# Patient Record
Sex: FEMALE | Race: BLACK OR AFRICAN AMERICAN | NOT HISPANIC OR LATINO | URBAN - METROPOLITAN AREA
[De-identification: names, ages, dates, MRNs, and addresses within clinical notes are randomized per-mention and may not be internally consistent; named-entity substitution may affect disease eponyms.]

---

## 2018-02-07 PROBLEM — Z00.00 ENCOUNTER FOR PREVENTIVE HEALTH EXAMINATION: Status: ACTIVE | Noted: 2018-02-07

## 2018-02-14 ENCOUNTER — OUTPATIENT (OUTPATIENT)
Dept: OUTPATIENT SERVICES | Facility: HOSPITAL | Age: 28
LOS: 1 days | Discharge: HOME | End: 2018-02-14

## 2018-02-14 ENCOUNTER — APPOINTMENT (OUTPATIENT)
Dept: OBGYN | Facility: CLINIC | Age: 28
End: 2018-02-14

## 2018-02-14 ENCOUNTER — NON-APPOINTMENT (OUTPATIENT)
Age: 28
End: 2018-02-14

## 2018-02-14 VITALS — WEIGHT: 177 LBS | HEIGHT: 67 IN | BODY MASS INDEX: 27.78 KG/M2

## 2018-02-14 LAB
BP DIAS: 60 MM HG
BP SYS: 100 MM HG

## 2018-02-14 RX ORDER — METOCLOPRAMIDE 10 MG/1
10 TABLET ORAL
Qty: 40 | Refills: 5 | Status: ACTIVE | COMMUNITY
Start: 2018-02-14 | End: 1900-01-01

## 2018-02-15 DIAGNOSIS — Z34.01 ENCOUNTER FOR SUPERVISION OF NORMAL FIRST PREGNANCY, FIRST TRIMESTER: ICD-10-CM

## 2018-02-18 ENCOUNTER — NON-APPOINTMENT (OUTPATIENT)
Age: 28
End: 2018-02-18

## 2018-02-18 LAB
C TRACH RRNA SPEC QL NAA+PROBE: NOT DETECTED
N GONORRHOEA RRNA SPEC QL NAA+PROBE: NOT DETECTED
SOURCE AMPLIFICATION: NORMAL

## 2018-02-20 ENCOUNTER — APPOINTMENT (OUTPATIENT)
Dept: OBGYN | Facility: CLINIC | Age: 28
End: 2018-02-20

## 2018-02-26 ENCOUNTER — APPOINTMENT (OUTPATIENT)
Dept: ANTEPARTUM | Facility: CLINIC | Age: 28
End: 2018-02-26

## 2018-02-26 DIAGNOSIS — Z31.5 ENCOUNTER FOR PROCREATIVE GENETIC COUNSELING: ICD-10-CM

## 2018-03-05 ENCOUNTER — EMERGENCY (EMERGENCY)
Facility: HOSPITAL | Age: 28
LOS: 0 days | Discharge: HOME | End: 2018-03-05
Attending: EMERGENCY MEDICINE | Admitting: OBSTETRICS & GYNECOLOGY

## 2018-03-05 VITALS
HEART RATE: 80 BPM | DIASTOLIC BLOOD PRESSURE: 61 MMHG | SYSTOLIC BLOOD PRESSURE: 116 MMHG | RESPIRATION RATE: 18 BRPM | OXYGEN SATURATION: 100 % | TEMPERATURE: 98 F

## 2018-03-05 VITALS
OXYGEN SATURATION: 100 % | TEMPERATURE: 98 F | HEART RATE: 71 BPM | DIASTOLIC BLOOD PRESSURE: 70 MMHG | SYSTOLIC BLOOD PRESSURE: 130 MMHG | RESPIRATION RATE: 18 BRPM

## 2018-03-05 DIAGNOSIS — Z3A.09 9 WEEKS GESTATION OF PREGNANCY: ICD-10-CM

## 2018-03-05 DIAGNOSIS — N93.9 ABNORMAL UTERINE AND VAGINAL BLEEDING, UNSPECIFIED: ICD-10-CM

## 2018-03-05 DIAGNOSIS — O20.0 THREATENED ABORTION: ICD-10-CM

## 2018-03-05 LAB
ANION GAP SERPL CALC-SCNC: 9 MMOL/L — SIGNIFICANT CHANGE UP (ref 7–14)
APPEARANCE UR: (no result)
APTT BLD: 24.9 SEC — LOW (ref 27–39.2)
BACTERIA # UR AUTO: SIGNIFICANT CHANGE UP
BASOPHILS # BLD AUTO: 0.03 K/UL — SIGNIFICANT CHANGE UP (ref 0–0.2)
BASOPHILS NFR BLD AUTO: 0.3 % — SIGNIFICANT CHANGE UP (ref 0–1)
BILIRUB UR-MCNC: NEGATIVE — SIGNIFICANT CHANGE UP
BLD GP AB SCN SERPL QL: SIGNIFICANT CHANGE UP
BUN SERPL-MCNC: 6 MG/DL — LOW (ref 10–20)
CALCIUM SERPL-MCNC: 9.7 MG/DL — SIGNIFICANT CHANGE UP (ref 8.5–10.1)
CHLORIDE SERPL-SCNC: 98 MMOL/L — SIGNIFICANT CHANGE UP (ref 98–110)
CO2 SERPL-SCNC: 27 MMOL/L — SIGNIFICANT CHANGE UP (ref 17–32)
COLOR SPEC: YELLOW — SIGNIFICANT CHANGE UP
COMMENT - URINE: SIGNIFICANT CHANGE UP
CREAT SERPL-MCNC: 0.6 MG/DL — LOW (ref 0.7–1.5)
DIFF PNL FLD: NEGATIVE — SIGNIFICANT CHANGE UP
EOSINOPHIL # BLD AUTO: 0.08 K/UL — SIGNIFICANT CHANGE UP (ref 0–0.7)
EOSINOPHIL NFR BLD AUTO: 0.7 % — SIGNIFICANT CHANGE UP (ref 0–8)
GLUCOSE SERPL-MCNC: 75 MG/DL — SIGNIFICANT CHANGE UP (ref 70–110)
GLUCOSE UR QL: NEGATIVE MG/DL — SIGNIFICANT CHANGE UP
HCT VFR BLD CALC: 36 % — LOW (ref 37–47)
HGB BLD-MCNC: 12.3 G/DL — SIGNIFICANT CHANGE UP (ref 12–16)
IMM GRANULOCYTES NFR BLD AUTO: 0.5 % — HIGH (ref 0.1–0.3)
INR BLD: 1.13 RATIO — SIGNIFICANT CHANGE UP (ref 0.65–1.3)
KETONES UR-MCNC: NEGATIVE — SIGNIFICANT CHANGE UP
LEUKOCYTE ESTERASE UR-ACNC: NEGATIVE — SIGNIFICANT CHANGE UP
LYMPHOCYTES # BLD AUTO: 2.66 K/UL — SIGNIFICANT CHANGE UP (ref 1.2–3.4)
LYMPHOCYTES # BLD AUTO: 22.3 % — SIGNIFICANT CHANGE UP (ref 20.5–51.1)
MCHC RBC-ENTMCNC: 27.6 PG — SIGNIFICANT CHANGE UP (ref 27–31)
MCHC RBC-ENTMCNC: 34.2 G/DL — SIGNIFICANT CHANGE UP (ref 32–37)
MCV RBC AUTO: 80.9 FL — LOW (ref 81–99)
MONOCYTES # BLD AUTO: 0.62 K/UL — HIGH (ref 0.1–0.6)
MONOCYTES NFR BLD AUTO: 5.2 % — SIGNIFICANT CHANGE UP (ref 1.7–9.3)
NEUTROPHILS # BLD AUTO: 8.5 K/UL — HIGH (ref 1.4–6.5)
NEUTROPHILS NFR BLD AUTO: 71 % — SIGNIFICANT CHANGE UP (ref 42.2–75.2)
NITRITE UR-MCNC: NEGATIVE — SIGNIFICANT CHANGE UP
NRBC # BLD: 0 /100 WBCS — SIGNIFICANT CHANGE UP (ref 0–0)
PH UR: 6 — SIGNIFICANT CHANGE UP (ref 5–8)
PLATELET # BLD AUTO: 329 K/UL — SIGNIFICANT CHANGE UP (ref 130–400)
POTASSIUM SERPL-MCNC: 4 MMOL/L — SIGNIFICANT CHANGE UP (ref 3.5–5)
POTASSIUM SERPL-SCNC: 4 MMOL/L — SIGNIFICANT CHANGE UP (ref 3.5–5)
PROT UR-MCNC: NEGATIVE MG/DL — SIGNIFICANT CHANGE UP
PROTHROM AB SERPL-ACNC: 12.2 SEC — SIGNIFICANT CHANGE UP (ref 9.95–12.87)
RBC # BLD: 4.45 M/UL — SIGNIFICANT CHANGE UP (ref 4.2–5.4)
RBC # FLD: 12.2 % — SIGNIFICANT CHANGE UP (ref 11.5–14.5)
SODIUM SERPL-SCNC: 134 MMOL/L — LOW (ref 135–146)
SP GR SPEC: 1.02 — SIGNIFICANT CHANGE UP (ref 1.01–1.03)
TYPE + AB SCN PNL BLD: SIGNIFICANT CHANGE UP
UROBILINOGEN FLD QL: 1 MG/DL (ref 0.2–0.2)
WBC # BLD: 11.95 K/UL — HIGH (ref 4.8–10.8)
WBC # FLD AUTO: 11.95 K/UL — HIGH (ref 4.8–10.8)

## 2018-03-05 NOTE — ED PROVIDER NOTE - NS ED ROS FT
Review of Systems:  	•	CONSTITUTIONAL - No fever, No diaphoresis, No weight change  	•	SKIN - No rash  	•	HEMATOLOGIC - No abnormal bleeding or bruising  	•	EYES - No eye pain, No blurred vision  	•	ENT - No change in hearing, No sore throat, No neck pain, No rhinorrhea, No ear pain  	•	RESPIRATORY - No shortness of breath, No cough  	•	CARDIAC - No chest pain, No palpitations  	•	GI - No abdominal pain, No nausea, No vomiting, No diarrhea, No constipation, No bright red blood per rectum or melena.                      •                 - No dysuria, frequency, hematuria.  +vaginal bleeding  	•	ENDO - No polydypsia, No polyuria, No heat/cold intolerance  	•	MUSCULOSKELETAL - No joint paint, No swelling, No back pain  	•	NEUROLOGIC - No numbness, No focal weakness, No headache, No dizziness
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
## 2018-03-05 NOTE — CONSULT NOTE ADULT - ASSESSMENT
26 yo  at 9w5d GA by LMP, twin pregnancy, hemodynamically stable with threatened .  -Type and screen pending: if RH negative, give rhogam  -F/u with OBGYN as scheduled next week  -Disposition per ED    Case discussed with Dr. juarez and Dr. Tenorio

## 2018-03-05 NOTE — ED PROVIDER NOTE - OBJECTIVE STATEMENT
27F no pmh, G1, lmp dec 27, currently 9 weeks per pt with twins on sono 2/14, OB = [facility], p/w vag bleeding. pt states she had 1 large blood clot today and rushed to hospital. no abd pain. no nvdc. no fever, chills. no dysuria, freq, hematuria. no cp, sob, palp, loc. no longer bleeding.

## 2018-03-05 NOTE — ED ADULT NURSE NOTE - OBJECTIVE STATEMENT
patient reports to the ED with a complaint of vaginal bleeding x1 episode today. patient states she passed 2 small clots when she went to the bathroom this morning. patient is currently 9 weeks pregnant with twins. also reports some abdominal cramping

## 2018-03-05 NOTE — ED PROVIDER NOTE - PHYSICAL EXAMINATION
VITAL SIGNS: AFebrile, vital signs stable  CONSTITUTIONAL: Well-developed; well-nourished; in no acute distress.  SKIN: Skin exam is warm and dry, no acute rash.  HEAD: Normocephalic; atraumatic.  EYES: Pupils equal round reactive to light, Extraocular movements intact; conjunctiva and sclera clear.  ENT: No nasal discharge; airway clear. Moist mucus membranes.  NECK: Supple; non tender. No rigidity  CARD: Regular rate and rhythm. Normal S1, S2; no murmurs, gallops, or rubs.  RESP: Lungs clear to auscultation bilaterally. No wheezes, rales or rhonchi.  ABD: Abdomen soft; non-tender; non-distended;  no hepatosplenomegaly. No costovertebral angle tenderness.   EXT: Normal ROM. No clubbing, cyanosis or edema. No calf tenderness to palpation.  NEURO: Alert and oriented x 3. No focal deficits.  PSYCH: Cooperative, appropriate.

## 2018-03-05 NOTE — CONSULT NOTE ADULT - SUBJECTIVE AND OBJECTIVE BOX
Chief Complaint: vaginal bleeding    HPI: 28 yo  at j9w5d GA by LMP 17 with known twin pregnancy here with vaginal bleeding. Pt passed 1 quarter sized clot at 11:00 today, no bleeding since then. States that she had some mild cramping yesterday and this morning, resolved now. Denies any continued bleeding or cramping at this time. She is feeling well overall. Had established prenatal care at [facility].      Allergies    No Known Allergies      PAST MEDICAL & SURGICAL HISTORY:    OB/GYN HISTORY:   LMP: 17           Cycle Length: regular             Days Flow: 5                                      Gyn: denies history of abnormal pap, STI, or ovarian cysts. Reports history of uterine fibroids                                      Obstetric:  G1  P 0      FAMILY HISTORY: Denies      SOCIAL HISTORY: Denies cigarette use, alcohol use, or illicit drug use    REVIEW OF SYSTEMS:    CONSTITUTIONAL: No weakness, fevers or chills  EYES/ENT: No visual changes;  No vertigo or throat pain   NECK: No pain or stiffness  RESPIRATORY: No cough, wheezing, hemoptysis; No shortness of breath  CARDIOVASCULAR: No chest pain or palpitations  GASTROINTESTINAL: No abdominal or epigastric pain. No nausea, vomiting, or hematemesis; No diarrhea or constipation. No melena or hematochezia.  GENITOURINARY: No dysuria, frequency or hematuria  NEUROLOGICAL: No numbness or weakness  SKIN: No itching, rashes  All other negative        Vital Signs Last 24 Hrs  T(F): 97.8 (05 Mar 2018 13:16), Max: 97.8 (05 Mar 2018 13:16)  HR: 71 (05 Mar 2018 13:16) (71 - 71)  BP: 130/70 (05 Mar 2018 13:16) (130/70 - 130/70)  RR: 18 (05 Mar 2018 13:16) (18 - 18)  SpO2: 100% (05 Mar 2018 13:16) (100% - 100%)    Physical Exam:  Constitutional: AAOx3, NAD  Breasts: Normal, no masses, nipple discharge, or tenderness bilaterally  Respiratory: CTAB  Cardiovascular: NL S1/S2  Gastrointestinal: Soft, nontender, nondistended, no rebound, guarding, or rigidity  Pelvic: Normal vulva, normal vagina, no bleeding, Cervix normal, closed, no bleeding, no abnormal discharge, Uterus anteverted, 10week sized, no fundal tenderness, no adnexal masses or tenderness    LABS:                        12.3   11.95 )-----------( 329      ( 05 Mar 2018 16:48 )             36.0         03-05    134<L>  |  98  |  6<L>  ----------------------------<  75  4.0   |  27  |  0.6<L>    Ca    9.7      05 Mar 2018 16:48      PT/INR - ( 05 Mar 2018 16:48 )   PT: 12.20 sec;   INR: 1.13 ratio         PTT - ( 05 Mar 2018 16:48 )  PTT:24.9 sec        RADIOLOGY & ADDITIONAL STUDIES:  2 IUPs with FH x2, dividing membranes seen. Pending official read.

## 2018-03-05 NOTE — ED PROVIDER NOTE - PROGRESS NOTE DETAILS
pt feels better, no longer bleeding, cleared by OBGYN for dc home, subchorionic hemorrhage on sono, has appt w dr young on 3/14, strict return precautions provided.
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
## 2018-03-06 LAB — HCG SERPL-ACNC: HIGH MIU/ML (ref 0–5)

## 2018-03-07 LAB
CULTURE RESULTS: NO GROWTH — SIGNIFICANT CHANGE UP
SPECIMEN SOURCE: SIGNIFICANT CHANGE UP

## 2018-03-08 ENCOUNTER — TRANSCRIPTION ENCOUNTER (OUTPATIENT)
Age: 28
End: 2018-03-08

## 2018-03-14 ENCOUNTER — NON-APPOINTMENT (OUTPATIENT)
Age: 28
End: 2018-03-14

## 2018-03-14 ENCOUNTER — LABORATORY RESULT (OUTPATIENT)
Age: 28
End: 2018-03-14

## 2018-03-14 ENCOUNTER — APPOINTMENT (OUTPATIENT)
Dept: OBGYN | Facility: CLINIC | Age: 28
End: 2018-03-14

## 2018-03-14 ENCOUNTER — APPOINTMENT (OUTPATIENT)
Dept: ANTEPARTUM | Facility: CLINIC | Age: 28
End: 2018-03-14

## 2018-03-14 ENCOUNTER — OUTPATIENT (OUTPATIENT)
Dept: OUTPATIENT SERVICES | Facility: HOSPITAL | Age: 28
LOS: 1 days | Discharge: HOME | End: 2018-03-14

## 2018-03-14 VITALS
OXYGEN SATURATION: 98 % | SYSTOLIC BLOOD PRESSURE: 137 MMHG | TEMPERATURE: 98 F | HEIGHT: 67 IN | HEART RATE: 68 BPM | DIASTOLIC BLOOD PRESSURE: 63 MMHG | WEIGHT: 180.38 LBS | BODY MASS INDEX: 28.31 KG/M2

## 2018-03-14 VITALS — WEIGHT: 180 LBS | BODY MASS INDEX: 28.19 KG/M2 | DIASTOLIC BLOOD PRESSURE: 63 MMHG | SYSTOLIC BLOOD PRESSURE: 137 MMHG

## 2018-03-14 DIAGNOSIS — D50.9 IRON DEFICIENCY ANEMIA, UNSPECIFIED: ICD-10-CM

## 2018-03-14 DIAGNOSIS — O36.80X2: ICD-10-CM

## 2018-03-14 DIAGNOSIS — O35.1XX2: ICD-10-CM

## 2018-03-14 DIAGNOSIS — O36.80X1 PREGNANCY WITH INCONCLUSIVE FETAL VIABILITY, FETUS 1: ICD-10-CM

## 2018-03-14 DIAGNOSIS — Z86.2 PERSONAL HISTORY OF DISEASES OF THE BLOOD AND BLOOD-FORMING ORGANS AND CERTAIN DISORDERS INVOLVING THE IMMUNE MECHANISM: ICD-10-CM

## 2018-03-14 DIAGNOSIS — Z34.90 ENCOUNTER FOR SUPERVISION OF NORMAL PREGNANCY, UNSPECIFIED, UNSPECIFIED TRIMESTER: ICD-10-CM

## 2018-03-14 DIAGNOSIS — O35.1XX1 MATERNAL CARE FOR (SUSPECTED) CHROMOSOMAL ABNORMALITY IN FETUS, FETUS 1: ICD-10-CM

## 2018-03-14 DIAGNOSIS — O30.041 TWIN PREGNANCY, DICHORIONIC/DIAMNIOTIC, FIRST TRIMESTER: ICD-10-CM

## 2018-03-14 LAB
BILIRUB UR QL STRIP: NEGATIVE
CLARITY UR: CLEAR
COLLECTION METHOD: NORMAL
GLUCOSE UR-MCNC: NEGATIVE
HCG UR QL: 0.2 EU/DL
HGB UR QL STRIP.AUTO: NEGATIVE
KETONES UR-MCNC: NEGATIVE
LEUKOCYTE ESTERASE UR QL STRIP: NEGATIVE
NITRITE UR QL STRIP: NEGATIVE
OB COMMENTS: NORMAL
PH UR STRIP: 7.5
PROT UR STRIP-MCNC: NEGATIVE
SCHEDULED VISIT: YES
SP GR UR STRIP: 1.03
URINE ALBUMIN/PROTEIN: NEGATIVE
URINE GLUCOSE: NEGATIVE
URINE KETONES: NEGATIVE
WEEKS GESTATION: NORMAL
WEEKS GESTATION: NORMAL

## 2018-03-14 RX ORDER — PRENATAL VIT NO.130/IRON/FOLIC 27MG-0.8MG
TABLET ORAL
Refills: 0 | Status: ACTIVE | COMMUNITY

## 2018-03-15 DIAGNOSIS — O30.041 TWIN PREGNANCY, DICHORIONIC/DIAMNIOTIC, FIRST TRIMESTER: ICD-10-CM

## 2018-03-17 LAB
ABO + RH PNL BLD: NORMAL
BACTERIA UR CULT: NORMAL
BASOPHILS # BLD AUTO: 0.02 K/UL
BASOPHILS NFR BLD AUTO: 0.2 %
BLD GP AB SCN SERPL QL: NORMAL
EOSINOPHIL # BLD AUTO: 0.12 K/UL
EOSINOPHIL NFR BLD AUTO: 1.1 %
HBV SURFACE AG SER QL: NONREACTIVE
HCT VFR BLD CALC: 35.5 %
HGB A MFR BLD: 63.4 %
HGB A2 MFR BLD: 3.1 %
HGB BLD-MCNC: 11.2 G/DL
HGB FRACT BLD-IMP: NORMAL
HGB S BLD QL SOLY: POSITIVE
HGB S MFR BLD: 33.5 %
HIV1+2 AB SPEC QL IA.RAPID: NONREACTIVE
IMM GRANULOCYTES NFR BLD AUTO: 0.4 %
LEAD BLD-MCNC: <1 UG/DL
LYMPHOCYTES # BLD AUTO: 2.29 K/UL
LYMPHOCYTES NFR BLD AUTO: 21.4 %
MAN DIFF?: NORMAL
MCHC RBC-ENTMCNC: 26.7 PG
MCHC RBC-ENTMCNC: 31.5 GM/DL
MCV RBC AUTO: 84.5 FL
MONOCYTES # BLD AUTO: 0.41 K/UL
MONOCYTES NFR BLD AUTO: 3.8 %
NEUTROPHILS # BLD AUTO: 7.81 K/UL
NEUTROPHILS NFR BLD AUTO: 73.1 %
PLATELET # BLD AUTO: 302 K/UL
RBC # BLD: 4.2 M/UL
RBC # FLD: 13.2 %
RUBV IGG FLD-ACNC: 8 INDEX
RUBV IGG SER-IMP: POSITIVE
T PALLIDUM AB SER QL IA: NEGATIVE
VZV AB TITR SER: NORMAL
VZV IGG SER IF-ACNC: 142.9 INDEX
WBC # FLD AUTO: 10.69 K/UL

## 2018-03-26 LAB
AR GENE MUT ANL BLD/T: NORMAL
CFTR MUT TESTED BLD/T: NORMAL

## 2018-04-11 ENCOUNTER — APPOINTMENT (OUTPATIENT)
Dept: OBGYN | Facility: CLINIC | Age: 28
End: 2018-04-11

## 2018-04-11 ENCOUNTER — OUTPATIENT (OUTPATIENT)
Dept: OUTPATIENT SERVICES | Facility: HOSPITAL | Age: 28
LOS: 1 days | Discharge: HOME | End: 2018-04-11

## 2018-04-11 ENCOUNTER — NON-APPOINTMENT (OUTPATIENT)
Age: 28
End: 2018-04-11

## 2018-04-11 VITALS — DIASTOLIC BLOOD PRESSURE: 60 MMHG | WEIGHT: 195 LBS | BODY MASS INDEX: 30.54 KG/M2 | SYSTOLIC BLOOD PRESSURE: 120 MMHG

## 2018-04-11 DIAGNOSIS — O30.049 TWIN PREGNANCY, DICHORIONIC/DIAMNIOTIC, UNSPECIFIED TRIMESTER: ICD-10-CM

## 2018-04-12 DIAGNOSIS — O30.049 TWIN PREGNANCY, DICHORIONIC/DIAMNIOTIC, UNSPECIFIED TRIMESTER: ICD-10-CM

## 2018-04-25 LAB — BACTERIA UR CULT: NORMAL

## 2018-05-04 ENCOUNTER — OUTPATIENT (OUTPATIENT)
Dept: OUTPATIENT SERVICES | Facility: HOSPITAL | Age: 28
LOS: 1 days | Discharge: HOME | End: 2018-05-04

## 2018-05-04 ENCOUNTER — APPOINTMENT (OUTPATIENT)
Dept: ANTEPARTUM | Facility: CLINIC | Age: 28
End: 2018-05-04

## 2018-05-04 VITALS
BODY MASS INDEX: 31.28 KG/M2 | WEIGHT: 199.7 LBS | DIASTOLIC BLOOD PRESSURE: 60 MMHG | TEMPERATURE: 99 F | SYSTOLIC BLOOD PRESSURE: 120 MMHG | OXYGEN SATURATION: 100 % | HEART RATE: 68 BPM

## 2018-05-04 LAB
ADDL FETAL HEART RATE: 143
BILIRUB UR QL STRIP: NEGATIVE
CLARITY UR: CLEAR
COLLECTION METHOD: NORMAL
FETAL HEART RATE (BPM): 141
GLUCOSE UR-MCNC: NEGATIVE
HCG UR QL: 0.2 EU/DL
HGB UR QL STRIP.AUTO: NEGATIVE
KETONES UR-MCNC: NEGATIVE
LEUKOCYTE ESTERASE UR QL STRIP: NEGATIVE
NITRITE UR QL STRIP: NEGATIVE
OB COMMENTS: NORMAL
PH UR STRIP: 6.5
PROT UR STRIP-MCNC: NEGATIVE
SCHEDULED VISIT: YES
SP GR UR STRIP: 1
URINE ALBUMIN/PROTEIN: NEGATIVE
URINE GLUCOSE: NEGATIVE
URINE KETONES: NORMAL
WEEKS GESTATION: NORMAL

## 2018-05-07 DIAGNOSIS — O30.042 TWIN PREGNANCY, DICHORIONIC/DIAMNIOTIC, SECOND TRIMESTER: ICD-10-CM

## 2018-05-07 DIAGNOSIS — O35.8XX1 MATERNAL CARE FOR OTHER (SUSPECTED) FETAL ABNORMALITY AND DAMAGE, FETUS 1: ICD-10-CM

## 2018-05-07 DIAGNOSIS — O35.8XX2 MATERNAL CARE FOR OTHER (SUSPECTED) FETAL ABNORMALITY AND DAMAGE, FETUS 2: ICD-10-CM

## 2018-05-09 ENCOUNTER — APPOINTMENT (OUTPATIENT)
Dept: OBGYN | Facility: CLINIC | Age: 28
End: 2018-05-09

## 2018-05-09 ENCOUNTER — OUTPATIENT (OUTPATIENT)
Dept: OUTPATIENT SERVICES | Facility: HOSPITAL | Age: 28
LOS: 1 days | Discharge: HOME | End: 2018-05-09

## 2018-05-09 ENCOUNTER — RESULT CHARGE (OUTPATIENT)
Age: 28
End: 2018-05-09

## 2018-05-09 VITALS — SYSTOLIC BLOOD PRESSURE: 110 MMHG | BODY MASS INDEX: 31.64 KG/M2 | DIASTOLIC BLOOD PRESSURE: 68 MMHG | WEIGHT: 202 LBS

## 2018-05-09 LAB
BILIRUB UR QL STRIP: NORMAL
CLARITY UR: CLEAR
COLLECTION METHOD: NORMAL
GLUCOSE UR-MCNC: NORMAL
HCG UR QL: 0.2 EU/DL
HGB UR QL STRIP.AUTO: NORMAL
KETONES UR-MCNC: NORMAL
LEUKOCYTE ESTERASE UR QL STRIP: NORMAL
NITRITE UR QL STRIP: NORMAL
PH UR STRIP: 6
PROT UR STRIP-MCNC: NORMAL
SP GR UR STRIP: 1.01

## 2018-05-11 ENCOUNTER — APPOINTMENT (OUTPATIENT)
Dept: ANTEPARTUM | Facility: CLINIC | Age: 28
End: 2018-05-11

## 2018-05-11 DIAGNOSIS — O30.042 TWIN PREGNANCY, DICHORIONIC/DIAMNIOTIC, SECOND TRIMESTER: ICD-10-CM

## 2018-05-21 ENCOUNTER — APPOINTMENT (OUTPATIENT)
Dept: ANTEPARTUM | Facility: CLINIC | Age: 28
End: 2018-05-21

## 2018-05-21 ENCOUNTER — OUTPATIENT (OUTPATIENT)
Dept: OUTPATIENT SERVICES | Facility: HOSPITAL | Age: 28
LOS: 1 days | Discharge: HOME | End: 2018-05-21

## 2018-05-21 VITALS
HEART RATE: 87 BPM | SYSTOLIC BLOOD PRESSURE: 110 MMHG | BODY MASS INDEX: 32.25 KG/M2 | TEMPERATURE: 97.6 F | DIASTOLIC BLOOD PRESSURE: 57 MMHG | HEIGHT: 67 IN | WEIGHT: 205.44 LBS | OXYGEN SATURATION: 100 %

## 2018-05-21 DIAGNOSIS — Z3A.21 21 WEEKS GESTATION OF PREGNANCY: ICD-10-CM

## 2018-05-21 DIAGNOSIS — O35.8XX1 MATERNAL CARE FOR OTHER (SUSPECTED) FETAL ABNORMALITY AND DAMAGE, FETUS 1: ICD-10-CM

## 2018-05-21 DIAGNOSIS — O30.042 TWIN PREGNANCY, DICHORIONIC/DIAMNIOTIC, SECOND TRIMESTER: ICD-10-CM

## 2018-05-21 DIAGNOSIS — O09.92 SUPERVISION OF HIGH RISK PREGNANCY, UNSPECIFIED, SECOND TRIMESTER: ICD-10-CM

## 2018-05-21 DIAGNOSIS — O35.8XX2 MATERNAL CARE FOR OTHER (SUSPECTED) FETAL ABNORMALITY AND DAMAGE, FETUS 2: ICD-10-CM

## 2018-05-21 LAB
BILIRUB UR QL STRIP: NEGATIVE
CLARITY UR: CLEAR
COLLECTION METHOD: NORMAL
GLUCOSE UR-MCNC: NEGATIVE
HCG UR QL: 0.2 EU/DL
HGB UR QL STRIP.AUTO: NEGATIVE
KETONES UR-MCNC: NEGATIVE
LEUKOCYTE ESTERASE UR QL STRIP: NEGATIVE
NITRITE UR QL STRIP: NEGATIVE
PH UR STRIP: 6
PROT UR STRIP-MCNC: NEGATIVE
SP GR UR STRIP: 1.02

## 2018-05-29 ENCOUNTER — APPOINTMENT (OUTPATIENT)
Dept: OBGYN | Facility: CLINIC | Age: 28
End: 2018-05-29

## 2018-06-04 ENCOUNTER — APPOINTMENT (OUTPATIENT)
Dept: ANTEPARTUM | Facility: CLINIC | Age: 28
End: 2018-06-04

## 2018-06-13 ENCOUNTER — RESULT REVIEW (OUTPATIENT)
Age: 28
End: 2018-06-13

## 2018-06-14 ENCOUNTER — NON-APPOINTMENT (OUTPATIENT)
Age: 28
End: 2018-06-14

## 2018-06-18 ENCOUNTER — APPOINTMENT (OUTPATIENT)
Dept: ANTEPARTUM | Facility: CLINIC | Age: 28
End: 2018-06-18

## 2019-08-20 ENCOUNTER — TRANSCRIPTION ENCOUNTER (OUTPATIENT)
Age: 29
End: 2019-08-20

## 2020-04-22 ENCOUNTER — EMERGENCY (EMERGENCY)
Facility: HOSPITAL | Age: 30
LOS: 0 days | Discharge: HOME | End: 2020-04-22
Attending: EMERGENCY MEDICINE | Admitting: EMERGENCY MEDICINE
Payer: COMMERCIAL

## 2020-04-22 VITALS
DIASTOLIC BLOOD PRESSURE: 64 MMHG | HEART RATE: 97 BPM | WEIGHT: 145.06 LBS | HEIGHT: 67 IN | RESPIRATION RATE: 19 BRPM | OXYGEN SATURATION: 98 % | TEMPERATURE: 98 F | SYSTOLIC BLOOD PRESSURE: 116 MMHG

## 2020-04-22 DIAGNOSIS — R07.89 OTHER CHEST PAIN: ICD-10-CM

## 2020-04-22 DIAGNOSIS — R07.9 CHEST PAIN, UNSPECIFIED: ICD-10-CM

## 2020-04-22 DIAGNOSIS — F17.200 NICOTINE DEPENDENCE, UNSPECIFIED, UNCOMPLICATED: ICD-10-CM

## 2020-04-22 PROCEDURE — 71046 X-RAY EXAM CHEST 2 VIEWS: CPT | Mod: 26

## 2020-04-22 PROCEDURE — 99284 EMERGENCY DEPT VISIT MOD MDM: CPT

## 2020-04-22 NOTE — ED PROVIDER NOTE - OBJECTIVE STATEMENT
29 year old female past medical history of marijuana use states she has had three days of central left sided chest pain which is described as a tightness. patient states pain tonight got worse after she was smoking. patient denies shortness of breath, no fever/chills, no cough.

## 2020-04-22 NOTE — ED PROVIDER NOTE - CHPI ED SYMPTOMS NEG
no shortness of breath/no vomiting/no chills/no cough/no diaphoresis/no back pain/no dizziness/no fever/no syncope/no nausea

## 2020-04-22 NOTE — ED PROVIDER NOTE - PATIENT PORTAL LINK FT
You can access the FollowMyHealth Patient Portal offered by [facility] by registering at the following website: [email or web address]. By joining [employer]’s FollowMyHealth portal, you will also be able to view your health information using other applications (apps) compatible with our system.

## 2020-04-22 NOTE — ED PROVIDER NOTE - CLINICAL SUMMARY MEDICAL DECISION MAKING FREE TEXT BOX
Healthy 30 yo F presents with 3 days of L sided, pressure like mild CP, worse this evening after smoking marijuana -- no associated fever, dyspnea, diaphoresis, nausea. VS normal, On exam looks well, clear lungs, RRR, soft, NT, ND abdomen, good distal pulses. EKG normal sinus, CXR without evidence of PTX, pneumomediastinum, PNA.     Low suspicion for ACS, has no ischemic risk factors, atypical pain and normal EKG. Cannot apply PERC due to OCP use but pain is not suggestive of PE, has no associated tachycardia or dyspnea.     Advised on rest, return precautions, follow up.

## 2020-04-24 ENCOUNTER — EMERGENCY (EMERGENCY)
Facility: HOSPITAL | Age: 30
LOS: 0 days | Discharge: HOME | End: 2020-04-24
Attending: STUDENT IN AN ORGANIZED HEALTH CARE EDUCATION/TRAINING PROGRAM | Admitting: STUDENT IN AN ORGANIZED HEALTH CARE EDUCATION/TRAINING PROGRAM
Payer: COMMERCIAL

## 2020-04-24 VITALS — TEMPERATURE: 99 F | RESPIRATION RATE: 20 BRPM | HEART RATE: 71 BPM | OXYGEN SATURATION: 100 %

## 2020-04-24 VITALS
HEART RATE: 62 BPM | TEMPERATURE: 99 F | SYSTOLIC BLOOD PRESSURE: 155 MMHG | RESPIRATION RATE: 20 BRPM | OXYGEN SATURATION: 93 % | WEIGHT: 179.24 LBS | HEIGHT: 67 IN | DIASTOLIC BLOOD PRESSURE: 72 MMHG

## 2020-04-24 DIAGNOSIS — R06.02 SHORTNESS OF BREATH: ICD-10-CM

## 2020-04-24 DIAGNOSIS — R07.89 OTHER CHEST PAIN: ICD-10-CM

## 2020-04-24 PROBLEM — Z78.9 OTHER SPECIFIED HEALTH STATUS: Chronic | Status: ACTIVE | Noted: 2020-04-22

## 2020-04-24 LAB
ALBUMIN SERPL ELPH-MCNC: 5 G/DL — SIGNIFICANT CHANGE UP (ref 3.5–5.2)
ALP SERPL-CCNC: 55 U/L — SIGNIFICANT CHANGE UP (ref 30–115)
ALT FLD-CCNC: 17 U/L — SIGNIFICANT CHANGE UP (ref 0–41)
ANION GAP SERPL CALC-SCNC: 13 MMOL/L — SIGNIFICANT CHANGE UP (ref 7–14)
AST SERPL-CCNC: 22 U/L — SIGNIFICANT CHANGE UP (ref 0–41)
BILIRUB SERPL-MCNC: 0.5 MG/DL — SIGNIFICANT CHANGE UP (ref 0.2–1.2)
BUN SERPL-MCNC: 9 MG/DL — LOW (ref 10–20)
CALCIUM SERPL-MCNC: 9.9 MG/DL — SIGNIFICANT CHANGE UP (ref 8.5–10.1)
CHLORIDE SERPL-SCNC: 100 MMOL/L — SIGNIFICANT CHANGE UP (ref 98–110)
CO2 SERPL-SCNC: 27 MMOL/L — SIGNIFICANT CHANGE UP (ref 17–32)
CREAT SERPL-MCNC: 0.7 MG/DL — SIGNIFICANT CHANGE UP (ref 0.7–1.5)
D DIMER BLD IA.RAPID-MCNC: 306 NG/ML DDU — HIGH (ref 0–230)
GLUCOSE SERPL-MCNC: 91 MG/DL — SIGNIFICANT CHANGE UP (ref 70–99)
HCT VFR BLD CALC: 42 % — SIGNIFICANT CHANGE UP (ref 37–47)
HGB BLD-MCNC: 14.2 G/DL — SIGNIFICANT CHANGE UP (ref 12–16)
MCHC RBC-ENTMCNC: 29 PG — SIGNIFICANT CHANGE UP (ref 27–31)
MCHC RBC-ENTMCNC: 33.8 G/DL — SIGNIFICANT CHANGE UP (ref 32–37)
MCV RBC AUTO: 85.7 FL — SIGNIFICANT CHANGE UP (ref 81–99)
NRBC # BLD: 0 /100 WBCS — SIGNIFICANT CHANGE UP (ref 0–0)
PLATELET # BLD AUTO: 306 K/UL — SIGNIFICANT CHANGE UP (ref 130–400)
POTASSIUM SERPL-MCNC: 3.5 MMOL/L — SIGNIFICANT CHANGE UP (ref 3.5–5)
POTASSIUM SERPL-SCNC: 3.5 MMOL/L — SIGNIFICANT CHANGE UP (ref 3.5–5)
PROT SERPL-MCNC: 8.4 G/DL — HIGH (ref 6–8)
RBC # BLD: 4.9 M/UL — SIGNIFICANT CHANGE UP (ref 4.2–5.4)
RBC # FLD: 12.3 % — SIGNIFICANT CHANGE UP (ref 11.5–14.5)
SODIUM SERPL-SCNC: 140 MMOL/L — SIGNIFICANT CHANGE UP (ref 135–146)
TROPONIN T SERPL-MCNC: <0.01 NG/ML — SIGNIFICANT CHANGE UP
WBC # BLD: 8.51 K/UL — SIGNIFICANT CHANGE UP (ref 4.8–10.8)
WBC # FLD AUTO: 8.51 K/UL — SIGNIFICANT CHANGE UP (ref 4.8–10.8)

## 2020-04-24 PROCEDURE — 99285 EMERGENCY DEPT VISIT HI MDM: CPT

## 2020-04-24 PROCEDURE — 71275 CT ANGIOGRAPHY CHEST: CPT | Mod: 26

## 2020-04-24 PROCEDURE — 71045 X-RAY EXAM CHEST 1 VIEW: CPT | Mod: 26

## 2020-04-24 PROCEDURE — 93010 ELECTROCARDIOGRAM REPORT: CPT

## 2020-04-24 NOTE — ED PROVIDER NOTE - PATIENT PORTAL LINK FT
You can access the FollowMyHealth Patient Portal offered by [facility] by registering at the following website: [email or web address]. By joining [employer]’s FollowMyHealth portal, you will also be able to view your health information using other applications (apps) compatible with our system.

## 2020-04-24 NOTE — ED PROVIDER NOTE - NS ED ROS FT
Review of Systems    Constitutional: (-) fever, (-) chills  Eyes/ENT: (-) blurry vision, (-) epistaxis, (-) sore throat  Cardiovascular: (+) chest pain, (-) syncope  Respiratory: (-) cough, (+) shortness of breath  Gastrointestinal: (-) pain, (-) nausea, (-) vomiting, (-) diarrhea  Musculoskeletal: (-) neck pain, (-) back pain, (-) body aches  Integumentary: (-) rash, (-) edema  Neurological: (-) headache, (-) altered mental status  Psychiatric: (-) hallucinations  Allergic/Immunologic: (-) pruritus

## 2020-04-24 NOTE — ED PROVIDER NOTE - PROVIDER TOKENS
FREE:[LAST:[your PMD],PHONE:[(   )    -],FAX:[(   )    -],FOLLOWUP:[1-3 Days]],PROVIDER:[TOKEN:[[id number]],FOLLOWUP:[1-3 Days]]

## 2020-04-24 NOTE — ED PROVIDER NOTE - ATTENDING CONTRIBUTION TO CARE
30 yo f   pt here for 1 week chest pain. pain began L sided and radiated to R. pain positional. no exertional or pleuritic component. pt went to [facility] x2 days and had xr which was negative. Pt denies travel, surgery, leg pain or swelling. no hiking.      vss  gen- NAD, aaox3  card-rrr  lungs-ctab, no wheezing or rhonchi  abd-sntnd, no guarding or rebound  neuro- full str/sensation, cn ii-xii grossly intact, normal coordination and gait    possible msk, possible covid given hypoxemia, given pt on OCP, will screen w/ ddimer  will get basic labs, ekg, cxr  will provide supportive care, serial exam and ED observation period

## 2020-04-24 NOTE — ED PROVIDER NOTE - CARE PROVIDERS DIRECT ADDRESSES
,DirectAddress_Unknown,[email or web address]

## 2020-04-24 NOTE — ED PROVIDER NOTE - PHYSICAL EXAMINATION
Gen: Alert, NAD, well appearing  Head: NC, AT, PERRL, EOMI, normal lids/conjunctiva  ENT: normal hearing  Neck: +supple, no tenderness/meningismus,  Pulm: Bilateral BS, normal resp effort, no wheeze/stridor/retractions  CV: RRR, no murmer  Abd: soft, NT/ND  Mskel: no edema/erythema/cyanosis. No leg swelling or tenderness  Skin: no rash, warm/dry  Neuro: AAOx3, no sensory/motor deficits

## 2020-04-24 NOTE — ED PROVIDER NOTE - CARE PROVIDER_API CALL
your PMD,   Phone: (   )    -  Fax: (   )    -  Follow Up Time: 1-3 Days    Brooks Jackson)  Cardiovascular Disease; Internal Medicine  [address] [place] [address]  Phone: [phone]  Fax: [phone]  Follow Up Time: 1-3 Days

## 2020-04-24 NOTE — ED PROVIDER NOTE - CLINICAL SUMMARY MEDICAL DECISION MAKING FREE TEXT BOX
pt w/ atypical chest pain. ekg nonischaemic. trop neg. cta negative for pe, ptx, infection. stable for d/c w/ pcp f/u

## 2020-04-24 NOTE — ED PROVIDER NOTE - OBJECTIVE STATEMENT
30 yo F c/o chest pain x 1 week. Patient states chest pain is moderate, constant, and is worse with lifting her kids. +SOB when laying supine. No f/c/n/v/c/d. No abdominal pains. NO leg pain or  swelling. +OCP use and marijuana use. No smoking or cocaine use. [place], had negative chest xray and was also seen in [facility] yesterday. She came back to ED today since she still has pain.

## 2020-10-21 ENCOUNTER — EMERGENCY (EMERGENCY)
Facility: HOSPITAL | Age: 30
LOS: 0 days | Discharge: HOME | End: 2020-10-21
Attending: EMERGENCY MEDICINE | Admitting: EMERGENCY MEDICINE
Payer: OTHER MISCELLANEOUS

## 2020-10-21 VITALS
OXYGEN SATURATION: 99 % | WEIGHT: 201.5 LBS | SYSTOLIC BLOOD PRESSURE: 137 MMHG | RESPIRATION RATE: 17 BRPM | HEIGHT: 67 IN | HEART RATE: 61 BPM | TEMPERATURE: 98 F | DIASTOLIC BLOOD PRESSURE: 68 MMHG
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
## 2020-10-21 DIAGNOSIS — S63.501A UNSPECIFIED SPRAIN OF RIGHT WRIST, INITIAL ENCOUNTER: ICD-10-CM
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
## 2020-10-21 DIAGNOSIS — M25.539 PAIN IN UNSPECIFIED WRIST: ICD-10-CM

## 2020-10-21 PROCEDURE — 99283 EMERGENCY DEPT VISIT LOW MDM: CPT

## 2020-10-21 PROCEDURE — 73130 X-RAY EXAM OF HAND: CPT | Mod: 26,RT

## 2020-10-21 PROCEDURE — 99053 MED SERV 10PM-8AM 24 HR FAC: CPT

## 2020-10-21 PROCEDURE — 73110 X-RAY EXAM OF WRIST: CPT | Mod: 26,RT

## 2020-10-21 RX ORDER — IBUPROFEN 200 MG
600 TABLET ORAL ONCE
Refills: 0 | Status: COMPLETED | OUTPATIENT
Start: 2020-10-21 | End: 2020-10-21

## 2020-10-21 RX ADMIN — Medication 600 MILLIGRAM(S): at 04:15

## 2020-10-21 NOTE — ED PROVIDER NOTE - OBJECTIVE STATEMENT
31 yo F, healthy, here for assessment of R wrist pain -- patient [place] in [place] and was positioning a patient, wrist got bent back and she has had pain with forced flexion since. No pain at rest. No swelling, weakness, paresthesias.

## 2020-10-21 NOTE — ED PROVIDER NOTE - PATIENT PORTAL LINK FT
You can access the FollowMyHealth Patient Portal offered by [facility] by registering at the following website: [email or web address]. By joining [email or web address]’s FollowMyHealth portal, you will also be able to view your health information using other applications (apps) compatible with our system.

## 2020-10-21 NOTE — ED PROVIDER NOTE - CLINICAL SUMMARY MEDICAL DECISION MAKING FREE TEXT BOX
XR without fracture, dislocation, likely sprain. Declined hard splint in favor of ace wrap. Advised on NSAIDs, RICE, ortho follow up prn.

## 2020-10-21 NOTE — ED ADULT NURSE NOTE - OBJECTIVE STATEMENT
Pt. complains of pain to right hand/wrist. Pt. 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 Right nad noted with slight swelling. Pt. states that she feel pain shooting upwards in arm any time the hand is moved. Pt. denies falling or hitting hand.

## 2020-10-21 NOTE — ED PROVIDER NOTE - PLAN OF CARE
R wrist pain, mechanism and exam more consistent with sprain vs fx, will get XR, give motrin, reassess

## 2020-10-21 NOTE — ED ADULT NURSE NOTE - SUICIDE SCREENING QUESTION 3
[de-identified] : I discussed the underlying pathophysiology of the patient's condition in great detail with the patient. We discussed the use of ice, Tylenol and anti-inflammatories to relieve pain. The patient was instructed in ROM exercises they are to do at home. At-home strengthening, and stretching exercises were discussed and demonstrated with the patient. We went over the wide ranging benefits of exercise for the patient health and I encouraged her to begin a diligent exercise program. \par I informed her that injections such as cortisone and Viscosupplementation are short term solutions. Patient understands she needs to consider knee replacement given she is losing motion and conservative measures are not providing enough relief. I informed the patient that surgery will be required to provide them long term relief from their symptoms. All of her questions were answered. She understands and consents to the plan. \par \par FU 3 months.\par after undergoing PT for her right knee.\par after following a diligent HEP\par \par The patient is an 71 year old female with bone on bone arthritis of their right knee. Based upon the patients continued symptoms and failure to respond to conservative treatment I have recommended a total knee replacement for this patient. A long discussion took place with the patient describing what a total joint replacement is and what the procedure would entail. A total knee model, similar to the implant that will be used during the operation was utilized to demonstrate and to discuss the various bearing surfaces of the implants. The hospitalization and post-operative care and rehabilitation were also discussed. The use of perioperative antibiotics and DVT prophylaxis were discussed. The risk, benefits and alternatives to a surgical intervention were discussed at length with the patient. The patient was also advised of risks related to the medical comorbidities and elevated body mass index (BMI). A lengthy discussion took place to review the most common complications including but not limited to: deep vein thrombosis, pulmonary embolus, heart attack, stroke, infection, wound breakdown, numbness, damage to nerves, tendon, muscles, arteries or other blood vessels, death and other possible complications from anesthesia. The patient was told that we will take steps to minimize these risks by using sterile technique, antibiotics and DVT prophylaxis when appropriate and follow the patient postoperatively in the office setting to monitor progress. The possibility of recurrent pain, no improvement in pain and actual worsening of pain were also discussed with the patient.\par \par The discharge plan of care focused on the patient going home following surgery. I encouraged the patient to make the necessary arrangements to have someone stay with them when they are discharged home. Following discharge, a home care nurse will visit the patient. They will open your home care case and request home physical therapy services. Home physical therapy will commence following discharge provided it is appropriate and covered by her health insurance benefit plan.\par \par The risks, benefits and alternative treatment options of total joint replacement were reviewed with the patient at great length. All questions were answered to the satisfaction of the patient. The patient participated in an interactive discussion of the total knee replacement implant planned for their surgery with questions answered. The patient agreed with the treatment plan, and has decided to move forward with the elective total knee replacement as planned. 
No

## 2020-10-21 NOTE — ED PROVIDER NOTE - NS ED ROS FT
Constitutional: no fever, chills  Cardiac: No chest pain, SOB or edema.  Respiratory: No cough or respiratory distress  GI: No nausea, vomiting, diarrhea or abdominal pain.  MS: see HPI  Neuro: No headache or weakness. No LOC.  Skin: No skin rash.  Except as documented in the HPI, all other systems are negative.

## 2020-10-21 NOTE — ED PROVIDER NOTE - CARE PROVIDER_API CALL
Juancarlos Whitmore  ORTHOPAEDIC SURGERY  [address] [place] [address]  Phone: [phone]  Fax: [phone]  Follow Up Time: Routine

## 2020-10-21 NOTE — ED PROVIDER NOTE - PHYSICAL EXAMINATION
VITAL SIGNS: I have reviewed nursing notes and confirm.  CONSTITUTIONAL: Well-developed; well-nourished; in no acute distress.  SKIN: Skin exam is warm and dry, no acute rash.  HEAD: Normocephalic; atraumatic.  NECK: Supple; non tender.  CARD: RRR, no murmur  RESP: No wheezes, rales or rhonchi.  ABD: Normal bowel sounds; soft; non-distended; non-tender  EXT: Normal ROM. pain with extreme flexion at R wrist, otherise full and painless active and passive ROM at all extremities, good radial pulses, no swelling, deformity, bruising.   NEURO: Alert, oriented. Grossly unremarkable. No focal deficits.  PSYCH: Cooperative, appropriate.

## 2020-10-21 NOTE — ED PROVIDER NOTE - CARE PLAN
Assessment and plan of treatment:	R wrist pain, mechanism and exam more consistent with sprain vs fx, will get XR, give motrin, reassess   Principal Discharge DX:	Wrist sprain, right, initial encounter  Assessment and plan of treatment:	R wrist pain, mechanism and exam more consistent with sprain vs fx, will get XR, give motrin, reassess

## 2020-10-21 NOTE — ED PROVIDER NOTE - NSFOLLOWUPINSTRUCTIONS_ED_ALL_ED_FT
Wrist Sprain    WHAT YOU NEED TO KNOW:    A wrist sprain happens when one or more ligaments in your wrist stretch or tear. Ligaments are tough tissues that connect bones and keep them in place, and support your joints.     DISCHARGE INSTRUCTIONS:    Return to the emergency department if:     You have severe pain or swelling.      Your injured wrist is red or has red streaks spreading from the injured area.       You have new trouble moving your hands, fingers, or wrist.      Your wrist, hand, or fingers feel cold or numb.       Your fingernails turn blue or gray.     Contact your healthcare provider if:     Your symptoms get worse.       You have pain and swelling for more than 48 hours.       You have questions or concerns about your condition or care.    Medicines:     NSAIDs, such as ibuprofen, help decrease swelling, pain, and fever. NSAIDs can cause stomach bleeding or kidney problems in certain people. If you take blood thinner medicine, always ask your healthcare provider if NSAIDs are safe for you. Always read the medicine label and follow directions.      Acetaminophen decreases pain and fever. It is available without a doctor's order. Ask how much to take and how often to take it. Follow directions. Read the labels of all other medicines you are using to see if they also contain acetaminophen, or ask your doctor or pharmacist. Acetaminophen can cause liver damage if not taken correctly. Do not use more than 4 grams (4,000 milligrams) total of acetaminophen in one day.       Take your medicine as directed. Contact your healthcare provider if you think your medicine is not helping or if you have side effects. Tell him or her if you are allergic to any medicine. Keep a list of the medicines, vitamins, and herbs you take. Include the amounts, and when and why you take them. Bring the list or the pill bottles to follow-up visits. Carry your medicine list with you in case of an emergency.    Self-care:     Rest your wrist for at least 48 hours. Avoid activities that cause pain.       Ice your wrist for 15 to 20 minutes every hour or as directed. Use an ice pack, or put crushed ice in a plastic bag. Cover it with a towel before you put it on your wrist. Ice helps prevent tissue damage and decreases swelling and pain.      Compress your wrist with an elastic bandage. This will help decrease swelling, support your wrist, and help it heal. Wear your wrist wrap as directed. The elastic bandage should be snug but not tight.      Elevate your wrist above the level of your heart as often as you can. This will help decrease swelling and pain. Prop your wrist on pillows or blankets to keep it elevated comfortably.     Wrist support: You may need to wear a splint or cast to support your wrist and prevent more damage. Wear your splint as directed. Ask for instructions on how to bathe while you are wearing a splint or cast.     Physical therapy: Your healthcare provider may recommend that you go to physical therapy. A physical therapist teaches you exercises to help improve movement and strength, and to decrease pain.    Follow up with your healthcare provider as directed: Write down your questions so you remember to ask them during your visits.        © Copyright [employer] 2019 All illustrations and images included in CareNotes are the copyrighted property of [employer] Inc[employer] or [employer]

## 2022-03-17 ENCOUNTER — EMERGENCY (EMERGENCY)
Facility: HOSPITAL | Age: 32
LOS: 0 days | Discharge: HOME | End: 2022-03-18
Attending: EMERGENCY MEDICINE | Admitting: EMERGENCY MEDICINE
Payer: SUBSIDIZED

## 2022-03-17 VITALS — WEIGHT: 197.98 LBS | HEIGHT: 67 IN
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
## 2022-03-17 DIAGNOSIS — S00.83XA CONTUSION OF OTHER PART OF HEAD, INITIAL ENCOUNTER: ICD-10-CM

## 2022-03-17 DIAGNOSIS — M79.89 OTHER SPECIFIED SOFT TISSUE DISORDERS: ICD-10-CM

## 2022-03-17 DIAGNOSIS — S61.307A UNSPECIFIED OPEN WOUND OF LEFT LITTLE FINGER WITH DAMAGE TO NAIL, INITIAL ENCOUNTER: ICD-10-CM

## 2022-03-17 DIAGNOSIS — M25.571 PAIN IN RIGHT ANKLE AND JOINTS OF RIGHT FOOT: ICD-10-CM

## 2022-03-17 PROCEDURE — 99284 EMERGENCY DEPT VISIT MOD MDM: CPT

## 2022-03-17 RX ORDER — IBUPROFEN 200 MG
600 TABLET ORAL ONCE
Refills: 0 | Status: COMPLETED | OUTPATIENT
Start: 2022-03-17 | End: 2022-03-17

## 2022-03-17 RX ADMIN — Medication 400 MILLIGRAM(S): at 22:54
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
## 2022-03-17 NOTE — ED PROVIDER NOTE - CARE PROVIDER_API CALL
Juancarlos Whitmore)  Orthopaedic Surgery  [address] [place] [address]  Phone: [phone]  Fax: [phone]  Follow Up Time: 1-3 Days
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
## 2022-03-17 NOTE — ED PROVIDER NOTE - NS ED ATTENDING STATEMENT MOD
This was a shared visit with the BELINDA. I reviewed and verified the documentation and independently performed the documented:
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
## 2022-03-17 NOTE — ED PROVIDER NOTE - PATIENT PORTAL LINK FT
You can access the FollowMyHealth Patient Portal offered by [facility] by registering at the following website: [email or web address]. By joining [email or web address]’s FollowMyHealth portal, you will also be able to view your health information using other applications (apps) compatible with our system.
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
## 2022-03-17 NOTE — ED PROVIDER NOTE - PHYSICAL EXAMINATION
Constitutional: Well developed, well nourished. NAD  Head: Normocephalic, atraumatic. superficial hematoma to forehead  Eyes: PERRL, EOMI.  ENT: No nasal discharge. Mucous membranes dry.  Neck: Supple. Painless ROM.  Cardiovascular:  Regular rate and rhythm.   Pulmonary:  Lungs clear to auscultation bilaterally.   no rib tenderness;  Abdominal: Soft. Nondistended. No rebound, guarding, rigidity.  Extremities. Pelvis stable. Left 5th finger with acrylic tip with fingernail avulsion; right foot + mild tenderness noted to distal ankle and lateral foot area; ankle flex/ext intact non tender; mild tenderness to 4,5 metatarsal area;   Skin: No rashes, cyanosis.  Neuro: AAOx3. No focal neurological deficits.  Psych: Normal mood. Normal affect.

## 2022-03-18 VITALS
DIASTOLIC BLOOD PRESSURE: 60 MMHG | TEMPERATURE: 99 F | RESPIRATION RATE: 18 BRPM | OXYGEN SATURATION: 100 % | SYSTOLIC BLOOD PRESSURE: 126 MMHG | HEART RATE: 60 BPM

## 2022-03-18 PROCEDURE — 70450 CT HEAD/BRAIN W/O DYE: CPT | Mod: 26,MA

## 2022-03-18 PROCEDURE — 73140 X-RAY EXAM OF FINGER(S): CPT | Mod: 26,LT

## 2022-03-18 PROCEDURE — 73630 X-RAY EXAM OF FOOT: CPT | Mod: 26,RT

## 2022-03-18 RX ADMIN — Medication 600 MILLIGRAM(S): at 02:50
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
## 2022-05-19 NOTE — ED PROVIDER NOTE - INTERNATIONAL TRAVEL
OB History and Physical    LMP: 2021  PATRICK: 2022  Dated by: L=13wk US  CC: eIOL    HPI: Amaris Arias 35 year old  @ 39w1d estimated gestational age, by LMP c/w 13 week ultrasound presents for eIOL. Denies leakage of fluid, vaginal bleeding, uterine contractions, endorses fetal movement.      Complications: Pt reports pregnancy has been complicated by:    #Asthma  - well-controlled, last inhaler use several years ago    #Lupus  - diagnosed ; last flare   - Hx prednisone& plaquenil use - no meds currently  - s/p MFM  - neg SSA, SSB antibodies, normal APLS work-up    #Suspected fetal macrosomia   -   #Sickle cell trait  - FOB neg  - s/p genetic counseling    #Hyperthyroidism  - previously on thyroid medication, has not taken in 10 years  - saw Dr. Dorie Escobedo (endo)    #AMA  - low risk cfDNA    [place] [facility]  Prenatal records are present at admission and were reviewed in EPIC.    ROS: Denies HA, epigastric pain, visual changes. ROS as above, otherwise negative.    OBHx:   OB History    Para Term  AB Living   5 2 2   2 2   SAB IAB Ectopic Molar Multiple Live Births   1   1     2      # Outcome Date GA Lbr Karel/2nd Weight Sex Delivery Anes PTL Lv   5 Current            4 Ectopic 2021     ECTOPIC      3 SAB  13w0d    Miscar      2 Term 08 37w0d  2892 g M Vag-Spont EPI N VASHTI   1 Term 06 38w0d  3430 g M Vag-Spont EPI N VASHTI       GynHx:     Pap: HPV+, ASCUS+  - patient declined colpo during pregnancy    PMHx:    Past Medical History:   Diagnosis Date   • Asthma    • Endometriosis    • Lupus (CMS/HCC)    • Ovarian cyst        SurgHx:   Past Surgical History:   Procedure Laterality Date   • No past surgeries     • No past surgeries     • No past surgeries         Meds:   Current Facility-Administered Medications   Medication Dose Route Frequency Provider Last Rate Last Admin   • calcium carbonate (TUMS) chewable tablet 500 mg  500 mg Oral Q4H  PRN Gina Roma       • lidocaine HCl (PF) (XYLOCAINE) 1 % injection 300 mg  30 mL Subcutaneous Once PRN Gina Roma       • oxyTOCIN (PITOCIN) injection 10 Units  10 Units Intramuscular Once PRN Gina Mike       • oxytocin (PITOCIN) 30 Units in sodium chloride 0.9% 500 mL  0-334 mL/hr Intravenous Continuous Gina Roma       • sodium chloride 0.9 % flush bag 25 mL  25 mL Intravenous PRN Gina Roma       • sodium chloride (PF) 0.9 % injection 2 mL  2 mL Intracatheter 2 times per day Gina Roma       • ondansetron (ZOFRAN) injection 4 mg  4 mg Intravenous BID PRN Gina Mike       • famotidine (PEPCID) tablet 20 mg  20 mg Oral BID PRN Gina Roma       • lactated ringers infusion   Intravenous Continuous Gina Mike 100 mL/hr at 05/19/22 0400 New Bag at 05/19/22 0400   • miSOPROStol (CYTOTEC) tablet 25 mcg  25 mcg Vaginal Q4H Gina Mike           Allergy:    ALLERGIES:   Allergen Reactions   • Aspirin HIVES   • Hydroxychloroquine RASH   • Hydroxychloroquine Sulfate Other (See Comments)     Unknown   • Pork Allergy   (Food Or Med) Other (See Comments)     Not allergic just food preference does not eat pork        FamHx: No fam hx of ovarian cancer/breast cancer/uterine cancer/colon cancer.   Family History   Problem Relation Age of Onset   • Asthma Mother    • Hypertension Father        SocHx: Denies tobacco, EtOH, illicit drug or opioid abuse      Objective:   Blood pressure 134/72, pulse 92, temperature 98.6 °F (37 °C), temperature source Oral, resp. rate 16, height 5' 5\" (1.651 m), weight 102.1 kg, last menstrual period 08/18/2021.    Gen. A+Ox3, NAD  CV.  RRR     Resp. CTAB, no wheeze or crackles.  Abd. gravid, NT  Extr.  no edema B/L , no calf tenderness    FHT: 130 baseline, moderate variability, + accels,  no decels  [place]: irregular    SVE: 2/30/-3    Limited BSUS: Single fetus, cephalic, +cardiac activity, anterior placenta, SDP 4.02    EFW: 3900g by extrapolation, AC 97th  percentile    Assessment/ Plan:     A/P:  Amaris Arias 35 year old  @ 39w1d ega here for eIOL    #IOL  - Admit to  Labor & Delivery  - CBC, T&S stat  - FEN: NPO w/ ice chips, IVF LR@125cc/hr  - FWB: Category 1, CEFM/TOCO  - MWB: Epidural PRN  - GBS status unknown, as patient declined swab; prophylaxis not needed at this time   - Labor plan: IOL with romano balloon and miso   - Plan to AROM when appropriate   - D/w patient plan of care of at bedside. All questions and concerns answered. Patient amendable to plan.     #Asthma  - well-controlled, last inhaler use several years ago    #Lupus  - diagnosed ; last flare   - Hx prednisone& plaquenil use - no meds currently  - s/p MFM; Level II WNL  - neg SSA, SSB antibodies, normal APLS work-up  - pt asymptomatic today    #Suspected fetal macrosomia     #Sickle cell trait  - FOB neg  - s/p genetic counseling    #Subclinical Hyperthyroidism  - previously on thyroid medication, has not taken in 10 years  - saw Dr. Dorie Escobedo (endo), plan to follow up outpatient postpartum    #AMA  - low risk cfDNA    Discussed with Dr. Don Mckeon,   OB/Gyn Resident, PGY-1  22     No

## 2024-01-01 NOTE — ED PROVIDER NOTE - NSFOLLOWUPINSTRUCTIONS_ED_ALL_ED_FT
Discharge patient  ONE TIME        Comments: Follow up with peds on 10/25/24    Always place your baby on their back to sleep.  Contact your pediatrician with questions.  Return to ED if temperature less than 97.4 or greater than 100, if infant is excessively fussy or sleepy, if infant will not feed x 2 feeds, if they have respiratory distress, or with other major concerns.         Chest Pain    Chest pain can be caused by many different conditions which may or may not be dangerous. Causes include heartburn, lung infections, heart attack, blood clot in lungs, skin infections, strain or damage to muscle, cartilage, or bones, etc. In addition to a history and physical examination, an electrocardiogram (ECG) or other lab tests may have been performed to determine the cause of your chest pain. Follow up with your primary care provider or with a cardiologist as instructed.     SEEK IMMEDIATE MEDICAL CARE IF YOU HAVE ANY OF THE FOLLOWING SYMPTOMS: worsening chest pain, coughing up blood, unexplained back/neck/jaw pain, severe abdominal pain, dizziness or lightheadedness, fainting, shortness of breath, sweaty or clammy skin, vomiting, or racing heart beat. These symptoms may represent a serious problem that is an emergency. Do not wait to see if the symptoms will go away. Get medical help right away. Call 911 and do not drive yourself to the hospital.

## 2024-07-08 ENCOUNTER — NON-APPOINTMENT (OUTPATIENT)
Age: 34
End: 2024-07-08

## 2024-07-08 ENCOUNTER — OUTPATIENT (OUTPATIENT)
Dept: OUTPATIENT SERVICES | Facility: HOSPITAL | Age: 34
LOS: 1 days | End: 2024-07-08
Payer: MEDICAID

## 2024-07-08 ENCOUNTER — OUTPATIENT (OUTPATIENT)
Dept: OUTPATIENT SERVICES | Facility: HOSPITAL | Age: 34
LOS: 1 days | End: 2024-07-08

## 2024-07-08 ENCOUNTER — APPOINTMENT (OUTPATIENT)
Dept: OBGYN | Facility: CLINIC | Age: 34
End: 2024-07-08
Payer: MEDICAID

## 2024-07-08 ENCOUNTER — APPOINTMENT (OUTPATIENT)
Dept: OBGYN | Facility: CLINIC | Age: 34
End: 2024-07-08

## 2024-07-08 VITALS
BODY MASS INDEX: 38.52 KG/M2 | WEIGHT: 245.44 LBS | DIASTOLIC BLOOD PRESSURE: 76 MMHG | HEIGHT: 67 IN | SYSTOLIC BLOOD PRESSURE: 124 MMHG

## 2024-07-08 DIAGNOSIS — Z34.90 ENCOUNTER FOR SUPERVISION OF NORMAL PREGNANCY, UNSPECIFIED, UNSPECIFIED TRIMESTER: ICD-10-CM

## 2024-07-08 DIAGNOSIS — Z71.3 DIETARY COUNSELING AND SURVEILLANCE: ICD-10-CM

## 2024-07-08 DIAGNOSIS — O30.049 TWIN PREGNANCY, DICHORIONIC/DIAMNIOTIC, UNSPECIFIED TRIMESTER: ICD-10-CM

## 2024-07-08 PROCEDURE — 99204 OFFICE O/P NEW MOD 45 MIN: CPT

## 2024-07-08 PROCEDURE — 76815 OB US LIMITED FETUS(S): CPT | Mod: 26

## 2024-07-08 PROCEDURE — 81002 URINALYSIS NONAUTO W/O SCOPE: CPT

## 2024-07-08 PROCEDURE — 76815 OB US LIMITED FETUS(S): CPT

## 2024-07-08 PROCEDURE — 99204 OFFICE O/P NEW MOD 45 MIN: CPT | Mod: 25

## 2024-07-10 DIAGNOSIS — Z34.90 ENCOUNTER FOR SUPERVISION OF NORMAL PREGNANCY, UNSPECIFIED, UNSPECIFIED TRIMESTER: ICD-10-CM

## 2024-07-10 LAB
BILIRUB UR QL STRIP: NORMAL
CLARITY UR: CLEAR
GLUCOSE UR-MCNC: NORMAL
HCG UR QL: 0.2 EU/DL
HGB UR QL STRIP.AUTO: NORMAL
KETONES UR-MCNC: NORMAL
LEUKOCYTE ESTERASE UR QL STRIP: NORMAL
NITRITE UR QL STRIP: NORMAL
PROT UR STRIP-MCNC: NORMAL
SP GR UR STRIP: 1020

## 2024-07-16 ENCOUNTER — OUTPATIENT (OUTPATIENT)
Dept: OUTPATIENT SERVICES | Facility: HOSPITAL | Age: 34
LOS: 1 days | End: 2024-07-16
Payer: MEDICAID

## 2024-07-16 ENCOUNTER — APPOINTMENT (OUTPATIENT)
Dept: ANTEPARTUM | Facility: CLINIC | Age: 34
End: 2024-07-16
Payer: MEDICAID

## 2024-07-16 ENCOUNTER — ASOB RESULT (OUTPATIENT)
Age: 34
End: 2024-07-16

## 2024-07-16 DIAGNOSIS — Z34.90 ENCOUNTER FOR SUPERVISION OF NORMAL PREGNANCY, UNSPECIFIED, UNSPECIFIED TRIMESTER: ICD-10-CM

## 2024-07-16 PROCEDURE — 76811 OB US DETAILED SNGL FETUS: CPT | Mod: 26

## 2024-07-16 PROCEDURE — ZZZZZ: CPT

## 2024-07-16 PROCEDURE — 76817 TRANSVAGINAL US OBSTETRIC: CPT | Mod: 26

## 2024-07-16 PROCEDURE — 76811 OB US DETAILED SNGL FETUS: CPT

## 2024-07-16 PROCEDURE — 76817 TRANSVAGINAL US OBSTETRIC: CPT

## 2024-07-17 DIAGNOSIS — Z14.8 GENETIC CARRIER OF OTHER DISEASE: ICD-10-CM

## 2024-07-17 DIAGNOSIS — O35.2XX0 MATERNAL CARE FOR (SUSPECTED) HEREDITARY DISEASE IN FETUS, NOT APPLICABLE OR UNSPECIFIED: ICD-10-CM

## 2024-07-17 DIAGNOSIS — O34.219 MATERNAL CARE FOR UNSPECIFIED TYPE SCAR FROM PREVIOUS CESAREAN DELIVERY: ICD-10-CM

## 2024-07-17 DIAGNOSIS — Z36.86 ENCOUNTER FOR ANTENATAL SCREENING FOR CERVICAL LENGTH: ICD-10-CM

## 2024-07-17 DIAGNOSIS — Z3A.21 21 WEEKS GESTATION OF PREGNANCY: ICD-10-CM

## 2024-07-17 DIAGNOSIS — Z36.3 ENCOUNTER FOR ANTENATAL SCREENING FOR MALFORMATIONS: ICD-10-CM

## 2024-07-17 DIAGNOSIS — O99.212 OBESITY COMPLICATING PREGNANCY, SECOND TRIMESTER: ICD-10-CM

## 2024-07-29 NOTE — ED ADULT NURSE NOTE - SUICIDE SCREENING QUESTION 1
X Size Of Lesion In Cm (Optional): 0
Introduction Text (Please End With A Colon): The following procedure was deferred:
Detail Level: Detailed
No

## 2024-07-30 ENCOUNTER — OUTPATIENT (OUTPATIENT)
Dept: OUTPATIENT SERVICES | Facility: HOSPITAL | Age: 34
LOS: 1 days | End: 2024-07-30
Payer: MEDICAID

## 2024-07-30 ENCOUNTER — APPOINTMENT (OUTPATIENT)
Dept: ANTEPARTUM | Facility: CLINIC | Age: 34
End: 2024-07-30
Payer: MEDICAID

## 2024-07-30 ENCOUNTER — ASOB RESULT (OUTPATIENT)
Age: 34
End: 2024-07-30

## 2024-07-30 DIAGNOSIS — O30.049 TWIN PREGNANCY, DICHORIONIC/DIAMNIOTIC, UNSPECIFIED TRIMESTER: ICD-10-CM

## 2024-07-30 DIAGNOSIS — O35.2XX0 MATERNAL CARE FOR (SUSPECTED) HEREDITARY DISEASE IN FETUS, NOT APPLICABLE OR UNSPECIFIED: ICD-10-CM

## 2024-07-30 DIAGNOSIS — O34.219 MATERNAL CARE FOR UNSPECIFIED TYPE SCAR FROM PREVIOUS CESAREAN DELIVERY: ICD-10-CM

## 2024-07-30 DIAGNOSIS — Z14.8 GENETIC CARRIER OF OTHER DISEASE: ICD-10-CM

## 2024-07-30 DIAGNOSIS — Z34.90 ENCOUNTER FOR SUPERVISION OF NORMAL PREGNANCY, UNSPECIFIED, UNSPECIFIED TRIMESTER: ICD-10-CM

## 2024-07-30 DIAGNOSIS — O99.212 OBESITY COMPLICATING PREGNANCY, SECOND TRIMESTER: ICD-10-CM

## 2024-07-30 DIAGNOSIS — Z36.3 ENCOUNTER FOR ANTENATAL SCREENING FOR MALFORMATIONS: ICD-10-CM

## 2024-07-30 DIAGNOSIS — Z3A.23 23 WEEKS GESTATION OF PREGNANCY: ICD-10-CM

## 2024-07-30 PROCEDURE — 83655 ASSAY OF LEAD: CPT

## 2024-07-30 PROCEDURE — 86780 TREPONEMA PALLIDUM: CPT

## 2024-07-30 PROCEDURE — 85027 COMPLETE CBC AUTOMATED: CPT

## 2024-07-30 PROCEDURE — 76816 OB US FOLLOW-UP PER FETUS: CPT | Mod: 26

## 2024-07-30 PROCEDURE — G0452: CPT | Mod: 26

## 2024-07-30 PROCEDURE — 83036 HEMOGLOBIN GLYCOSYLATED A1C: CPT

## 2024-07-30 PROCEDURE — 86803 HEPATITIS C AB TEST: CPT

## 2024-07-30 PROCEDURE — 76816 OB US FOLLOW-UP PER FETUS: CPT

## 2024-07-30 PROCEDURE — 86900 BLOOD TYPING SEROLOGIC ABO: CPT

## 2024-07-30 PROCEDURE — 86850 RBC ANTIBODY SCREEN: CPT

## 2024-07-30 PROCEDURE — 87340 HEPATITIS B SURFACE AG IA: CPT

## 2024-07-30 PROCEDURE — 81329 SMN1 GENE DOS/DELETION ALYS: CPT

## 2024-07-31 DIAGNOSIS — O30.049 TWIN PREGNANCY, DICHORIONIC/DIAMNIOTIC, UNSPECIFIED TRIMESTER: ICD-10-CM

## 2024-08-05 ENCOUNTER — APPOINTMENT (OUTPATIENT)
Dept: OBGYN | Facility: CLINIC | Age: 34
End: 2024-08-05

## 2024-08-05 ENCOUNTER — OUTPATIENT (OUTPATIENT)
Dept: OUTPATIENT SERVICES | Facility: HOSPITAL | Age: 34
LOS: 1 days | End: 2024-08-05
Payer: MEDICAID

## 2024-08-05 DIAGNOSIS — Z34.90 ENCOUNTER FOR SUPERVISION OF NORMAL PREGNANCY, UNSPECIFIED, UNSPECIFIED TRIMESTER: ICD-10-CM

## 2024-08-05 PROCEDURE — 82950 GLUCOSE TEST: CPT

## 2024-08-05 PROCEDURE — 99213 OFFICE O/P EST LOW 20 MIN: CPT

## 2024-08-05 PROCEDURE — 81002 URINALYSIS NONAUTO W/O SCOPE: CPT

## 2024-08-05 PROCEDURE — 85027 COMPLETE CBC AUTOMATED: CPT

## 2024-08-06 DIAGNOSIS — Z34.90 ENCOUNTER FOR SUPERVISION OF NORMAL PREGNANCY, UNSPECIFIED, UNSPECIFIED TRIMESTER: ICD-10-CM

## 2024-09-03 ENCOUNTER — NON-APPOINTMENT (OUTPATIENT)
Age: 34
End: 2024-09-03

## 2024-09-03 ENCOUNTER — APPOINTMENT (OUTPATIENT)
Dept: OBGYN | Facility: CLINIC | Age: 34
End: 2024-09-03
Payer: MEDICAID

## 2024-09-03 ENCOUNTER — OUTPATIENT (OUTPATIENT)
Dept: OUTPATIENT SERVICES | Facility: HOSPITAL | Age: 34
LOS: 1 days | End: 2024-09-03
Payer: MEDICAID

## 2024-09-03 VITALS
BODY MASS INDEX: 40.15 KG/M2 | WEIGHT: 255.8 LBS | HEIGHT: 67 IN | DIASTOLIC BLOOD PRESSURE: 70 MMHG | SYSTOLIC BLOOD PRESSURE: 110 MMHG

## 2024-09-03 DIAGNOSIS — Z34.90 ENCOUNTER FOR SUPERVISION OF NORMAL PREGNANCY, UNSPECIFIED, UNSPECIFIED TRIMESTER: ICD-10-CM

## 2024-09-03 DIAGNOSIS — Z23 ENCOUNTER FOR IMMUNIZATION: ICD-10-CM

## 2024-09-03 LAB
BILIRUB UR QL STRIP: NORMAL
BILIRUB UR QL STRIP: NORMAL
CLARITY UR: CLEAR
CLARITY UR: CLEAR
COLLECTION METHOD: NORMAL
COLLECTION METHOD: NORMAL
GLUCOSE UR-MCNC: NORMAL
GLUCOSE UR-MCNC: NORMAL
HCG UR QL: 0.2 EU/DL
HCG UR QL: 0.2 EU/DL
HGB UR QL STRIP.AUTO: NORMAL
HGB UR QL STRIP.AUTO: NORMAL
KETONES UR-MCNC: NORMAL
KETONES UR-MCNC: NORMAL
LEUKOCYTE ESTERASE UR QL STRIP: NORMAL
LEUKOCYTE ESTERASE UR QL STRIP: NORMAL
NITRITE UR QL STRIP: NORMAL
NITRITE UR QL STRIP: NORMAL
PH UR STRIP: 6
PH UR STRIP: 6
PROT UR STRIP-MCNC: NORMAL
PROT UR STRIP-MCNC: NORMAL
SP GR UR STRIP: 1.01
SP GR UR STRIP: 1020

## 2024-09-03 PROCEDURE — 81002 URINALYSIS NONAUTO W/O SCOPE: CPT

## 2024-09-03 PROCEDURE — 90715 TDAP VACCINE 7 YRS/> IM: CPT

## 2024-09-03 PROCEDURE — 99213 OFFICE O/P EST LOW 20 MIN: CPT

## 2024-09-03 PROCEDURE — 90471 IMMUNIZATION ADMIN: CPT

## 2024-09-04 DIAGNOSIS — Z23 ENCOUNTER FOR IMMUNIZATION: ICD-10-CM

## 2024-09-16 ENCOUNTER — NON-APPOINTMENT (OUTPATIENT)
Age: 34
End: 2024-09-16

## 2024-09-17 ENCOUNTER — NON-APPOINTMENT (OUTPATIENT)
Age: 34
End: 2024-09-17

## 2024-09-17 ENCOUNTER — APPOINTMENT (OUTPATIENT)
Dept: OBGYN | Facility: CLINIC | Age: 34
End: 2024-09-17
Payer: MEDICAID

## 2024-09-17 ENCOUNTER — OUTPATIENT (OUTPATIENT)
Dept: OUTPATIENT SERVICES | Facility: HOSPITAL | Age: 34
LOS: 1 days | End: 2024-09-17
Payer: MEDICAID

## 2024-09-17 VITALS — WEIGHT: 260 LBS | BODY MASS INDEX: 40.72 KG/M2 | SYSTOLIC BLOOD PRESSURE: 117 MMHG | DIASTOLIC BLOOD PRESSURE: 72 MMHG

## 2024-09-17 DIAGNOSIS — Z34.90 ENCOUNTER FOR SUPERVISION OF NORMAL PREGNANCY, UNSPECIFIED, UNSPECIFIED TRIMESTER: ICD-10-CM

## 2024-09-17 DIAGNOSIS — Z23 ENCOUNTER FOR IMMUNIZATION: ICD-10-CM

## 2024-09-17 PROCEDURE — 99213 OFFICE O/P EST LOW 20 MIN: CPT

## 2024-09-17 PROCEDURE — 81002 URINALYSIS NONAUTO W/O SCOPE: CPT

## 2024-09-18 DIAGNOSIS — Z34.90 ENCOUNTER FOR SUPERVISION OF NORMAL PREGNANCY, UNSPECIFIED, UNSPECIFIED TRIMESTER: ICD-10-CM

## 2024-09-24 ENCOUNTER — ASOB RESULT (OUTPATIENT)
Age: 34
End: 2024-09-24

## 2024-09-24 ENCOUNTER — APPOINTMENT (OUTPATIENT)
Dept: ANTEPARTUM | Facility: CLINIC | Age: 34
End: 2024-09-24
Payer: MEDICAID

## 2024-09-24 ENCOUNTER — OUTPATIENT (OUTPATIENT)
Dept: OUTPATIENT SERVICES | Facility: HOSPITAL | Age: 34
LOS: 1 days | End: 2024-09-24
Payer: MEDICAID

## 2024-09-24 DIAGNOSIS — Z34.90 ENCOUNTER FOR SUPERVISION OF NORMAL PREGNANCY, UNSPECIFIED, UNSPECIFIED TRIMESTER: ICD-10-CM

## 2024-09-24 PROCEDURE — 76819 FETAL BIOPHYS PROFIL W/O NST: CPT

## 2024-09-24 PROCEDURE — 76816 OB US FOLLOW-UP PER FETUS: CPT

## 2024-09-24 PROCEDURE — 76816 OB US FOLLOW-UP PER FETUS: CPT | Mod: 26

## 2024-09-24 PROCEDURE — 76819 FETAL BIOPHYS PROFIL W/O NST: CPT | Mod: 26,59

## 2024-09-25 DIAGNOSIS — O99.213 OBESITY COMPLICATING PREGNANCY, THIRD TRIMESTER: ICD-10-CM

## 2024-09-25 DIAGNOSIS — O34.219 MATERNAL CARE FOR UNSPECIFIED TYPE SCAR FROM PREVIOUS CESAREAN DELIVERY: ICD-10-CM

## 2024-09-25 DIAGNOSIS — Z3A.31 31 WEEKS GESTATION OF PREGNANCY: ICD-10-CM

## 2024-09-25 DIAGNOSIS — Z14.8 GENETIC CARRIER OF OTHER DISEASE: ICD-10-CM

## 2024-09-25 DIAGNOSIS — O09.523 SUPERVISION OF ELDERLY MULTIGRAVIDA, THIRD TRIMESTER: ICD-10-CM

## 2024-09-25 DIAGNOSIS — O35.8XX0 MATERNAL CARE FOR OTHER (SUSPECTED) FETAL ABNORMALITY AND DAMAGE, NOT APPLICABLE OR UNSPECIFIED: ICD-10-CM

## 2024-09-30 ENCOUNTER — NON-APPOINTMENT (OUTPATIENT)
Age: 34
End: 2024-09-30

## 2024-10-01 ENCOUNTER — APPOINTMENT (OUTPATIENT)
Dept: OBGYN | Facility: CLINIC | Age: 34
End: 2024-10-01
Payer: MEDICAID

## 2024-10-01 ENCOUNTER — OUTPATIENT (OUTPATIENT)
Dept: OUTPATIENT SERVICES | Facility: HOSPITAL | Age: 34
LOS: 1 days | End: 2024-10-01
Payer: MEDICAID

## 2024-10-01 VITALS
WEIGHT: 260 LBS | BODY MASS INDEX: 40.81 KG/M2 | HEIGHT: 67 IN | SYSTOLIC BLOOD PRESSURE: 112 MMHG | DIASTOLIC BLOOD PRESSURE: 64 MMHG

## 2024-10-01 DIAGNOSIS — Z23 ENCOUNTER FOR IMMUNIZATION: ICD-10-CM

## 2024-10-01 DIAGNOSIS — Z34.90 ENCOUNTER FOR SUPERVISION OF NORMAL PREGNANCY, UNSPECIFIED, UNSPECIFIED TRIMESTER: ICD-10-CM

## 2024-10-01 LAB
BILIRUB UR QL STRIP: NORMAL
CLARITY UR: CLEAR
COLLECTION METHOD: NORMAL
GLUCOSE UR-MCNC: NORMAL
HCG UR QL: 0.2 EU/DL
HGB UR QL STRIP.AUTO: NORMAL
KETONES UR-MCNC: NORMAL
LEUKOCYTE ESTERASE UR QL STRIP: NORMAL
NITRITE UR QL STRIP: NORMAL
PH UR STRIP: 6.5
PROT UR STRIP-MCNC: NORMAL
SP GR UR STRIP: 1020

## 2024-10-01 PROCEDURE — 99213 OFFICE O/P EST LOW 20 MIN: CPT

## 2024-10-01 PROCEDURE — 81002 URINALYSIS NONAUTO W/O SCOPE: CPT

## 2024-10-01 PROCEDURE — 90678 RSV VACC PREF BIVALENT IM: CPT

## 2024-10-08 ENCOUNTER — OUTPATIENT (OUTPATIENT)
Dept: OUTPATIENT SERVICES | Facility: HOSPITAL | Age: 34
LOS: 1 days | End: 2024-10-08
Payer: MEDICAID

## 2024-10-08 ENCOUNTER — APPOINTMENT (OUTPATIENT)
Dept: ANTEPARTUM | Facility: CLINIC | Age: 34
End: 2024-10-08
Payer: MEDICAID

## 2024-10-08 ENCOUNTER — ASOB RESULT (OUTPATIENT)
Age: 34
End: 2024-10-08

## 2024-10-08 DIAGNOSIS — Z34.90 ENCOUNTER FOR SUPERVISION OF NORMAL PREGNANCY, UNSPECIFIED, UNSPECIFIED TRIMESTER: ICD-10-CM

## 2024-10-08 PROCEDURE — 76819 FETAL BIOPHYS PROFIL W/O NST: CPT | Mod: 26

## 2024-10-08 PROCEDURE — 76819 FETAL BIOPHYS PROFIL W/O NST: CPT

## 2024-10-09 DIAGNOSIS — Z3A.33 33 WEEKS GESTATION OF PREGNANCY: ICD-10-CM

## 2024-10-09 DIAGNOSIS — O35.2XX0 MATERNAL CARE FOR (SUSPECTED) HEREDITARY DISEASE IN FETUS, NOT APPLICABLE OR UNSPECIFIED: ICD-10-CM

## 2024-10-09 DIAGNOSIS — O09.523 SUPERVISION OF ELDERLY MULTIGRAVIDA, THIRD TRIMESTER: ICD-10-CM

## 2024-10-09 DIAGNOSIS — O34.219 MATERNAL CARE FOR UNSPECIFIED TYPE SCAR FROM PREVIOUS CESAREAN DELIVERY: ICD-10-CM

## 2024-10-09 DIAGNOSIS — O99.213 OBESITY COMPLICATING PREGNANCY, THIRD TRIMESTER: ICD-10-CM

## 2024-10-09 DIAGNOSIS — Z14.8 GENETIC CARRIER OF OTHER DISEASE: ICD-10-CM

## 2024-10-11 ENCOUNTER — NON-APPOINTMENT (OUTPATIENT)
Age: 34
End: 2024-10-11

## 2024-10-15 ENCOUNTER — NON-APPOINTMENT (OUTPATIENT)
Age: 34
End: 2024-10-15

## 2024-10-15 ENCOUNTER — APPOINTMENT (OUTPATIENT)
Dept: OBGYN | Facility: CLINIC | Age: 34
End: 2024-10-15

## 2024-10-15 ENCOUNTER — APPOINTMENT (OUTPATIENT)
Dept: ANTEPARTUM | Facility: CLINIC | Age: 34
End: 2024-10-15
Payer: MEDICAID

## 2024-10-15 ENCOUNTER — APPOINTMENT (OUTPATIENT)
Dept: OBGYN | Facility: CLINIC | Age: 34
End: 2024-10-15
Payer: MEDICAID

## 2024-10-15 ENCOUNTER — ASOB RESULT (OUTPATIENT)
Age: 34
End: 2024-10-15

## 2024-10-15 ENCOUNTER — OUTPATIENT (OUTPATIENT)
Dept: OUTPATIENT SERVICES | Facility: HOSPITAL | Age: 34
LOS: 1 days | End: 2024-10-15
Payer: MEDICAID

## 2024-10-15 VITALS
DIASTOLIC BLOOD PRESSURE: 60 MMHG | WEIGHT: 134.31 LBS | HEIGHT: 67 IN | BODY MASS INDEX: 21.08 KG/M2 | SYSTOLIC BLOOD PRESSURE: 108 MMHG

## 2024-10-15 DIAGNOSIS — Z34.90 ENCOUNTER FOR SUPERVISION OF NORMAL PREGNANCY, UNSPECIFIED, UNSPECIFIED TRIMESTER: ICD-10-CM

## 2024-10-15 PROCEDURE — 76819 FETAL BIOPHYS PROFIL W/O NST: CPT | Mod: 26

## 2024-10-15 PROCEDURE — 76819 FETAL BIOPHYS PROFIL W/O NST: CPT

## 2024-10-16 LAB
BILIRUB UR QL STRIP: NEGATIVE
CLARITY UR: CLEAR
COLLECTION METHOD: NORMAL
GLUCOSE UR-MCNC: NEGATIVE
HCG UR QL: NEGATIVE EU/DL
HGB UR QL STRIP.AUTO: NEGATIVE
KETONES UR-MCNC: NEGATIVE
LEUKOCYTE ESTERASE UR QL STRIP: NEGATIVE
NITRITE UR QL STRIP: NEGATIVE
PH UR STRIP: 6.5
PROT UR STRIP-MCNC: NEGATIVE
SP GR UR STRIP: 1.02

## 2024-10-22 ENCOUNTER — NON-APPOINTMENT (OUTPATIENT)
Age: 34
End: 2024-10-22

## 2024-10-22 ENCOUNTER — APPOINTMENT (OUTPATIENT)
Dept: ANTEPARTUM | Facility: CLINIC | Age: 34
End: 2024-10-22
Payer: MEDICAID

## 2024-10-22 ENCOUNTER — ASOB RESULT (OUTPATIENT)
Age: 34
End: 2024-10-22

## 2024-10-22 ENCOUNTER — OUTPATIENT (OUTPATIENT)
Dept: OUTPATIENT SERVICES | Facility: HOSPITAL | Age: 34
LOS: 1 days | End: 2024-10-22
Payer: MEDICAID

## 2024-10-22 DIAGNOSIS — Z34.90 ENCOUNTER FOR SUPERVISION OF NORMAL PREGNANCY, UNSPECIFIED, UNSPECIFIED TRIMESTER: ICD-10-CM

## 2024-10-22 DIAGNOSIS — O99.213 OBESITY COMPLICATING PREGNANCY, THIRD TRIMESTER: ICD-10-CM

## 2024-10-22 DIAGNOSIS — Z3A.34 34 WEEKS GESTATION OF PREGNANCY: ICD-10-CM

## 2024-10-22 DIAGNOSIS — O34.219 MATERNAL CARE FOR UNSPECIFIED TYPE SCAR FROM PREVIOUS CESAREAN DELIVERY: ICD-10-CM

## 2024-10-22 DIAGNOSIS — Z14.8 GENETIC CARRIER OF OTHER DISEASE: ICD-10-CM

## 2024-10-22 DIAGNOSIS — O35.2XX0 MATERNAL CARE FOR (SUSPECTED) HEREDITARY DISEASE IN FETUS, NOT APPLICABLE OR UNSPECIFIED: ICD-10-CM

## 2024-10-22 DIAGNOSIS — O09.523 SUPERVISION OF ELDERLY MULTIGRAVIDA, THIRD TRIMESTER: ICD-10-CM

## 2024-10-22 PROCEDURE — 76816 OB US FOLLOW-UP PER FETUS: CPT

## 2024-10-22 PROCEDURE — 76816 OB US FOLLOW-UP PER FETUS: CPT | Mod: 26

## 2024-10-22 PROCEDURE — 76819 FETAL BIOPHYS PROFIL W/O NST: CPT | Mod: 26,59

## 2024-10-22 PROCEDURE — 76819 FETAL BIOPHYS PROFIL W/O NST: CPT

## 2024-10-24 DIAGNOSIS — Z14.8 GENETIC CARRIER OF OTHER DISEASE: ICD-10-CM

## 2024-10-24 DIAGNOSIS — Z03.74 ENCOUNTER FOR SUSPECTED PROBLEM WITH FETAL GROWTH RULED OUT: ICD-10-CM

## 2024-10-24 DIAGNOSIS — O99.213 OBESITY COMPLICATING PREGNANCY, THIRD TRIMESTER: ICD-10-CM

## 2024-10-24 DIAGNOSIS — O34.219 MATERNAL CARE FOR UNSPECIFIED TYPE SCAR FROM PREVIOUS CESAREAN DELIVERY: ICD-10-CM

## 2024-10-24 DIAGNOSIS — O09.523 SUPERVISION OF ELDERLY MULTIGRAVIDA, THIRD TRIMESTER: ICD-10-CM

## 2024-10-24 DIAGNOSIS — Z3A.35 35 WEEKS GESTATION OF PREGNANCY: ICD-10-CM

## 2024-10-28 ENCOUNTER — NON-APPOINTMENT (OUTPATIENT)
Age: 34
End: 2024-10-28

## 2024-10-29 ENCOUNTER — OUTPATIENT (OUTPATIENT)
Dept: OUTPATIENT SERVICES | Facility: HOSPITAL | Age: 34
LOS: 1 days | End: 2024-10-29
Payer: MEDICAID

## 2024-10-29 ENCOUNTER — APPOINTMENT (OUTPATIENT)
Dept: ANTEPARTUM | Facility: CLINIC | Age: 34
End: 2024-10-29
Payer: MEDICAID

## 2024-10-29 ENCOUNTER — APPOINTMENT (OUTPATIENT)
Dept: OBGYN | Facility: CLINIC | Age: 34
End: 2024-10-29
Payer: MEDICAID

## 2024-10-29 ENCOUNTER — ASOB RESULT (OUTPATIENT)
Age: 34
End: 2024-10-29

## 2024-10-29 VITALS
BODY MASS INDEX: 41.35 KG/M2 | WEIGHT: 264 LBS | DIASTOLIC BLOOD PRESSURE: 67 MMHG | HEART RATE: 102 BPM | SYSTOLIC BLOOD PRESSURE: 123 MMHG

## 2024-10-29 DIAGNOSIS — Z34.90 ENCOUNTER FOR SUPERVISION OF NORMAL PREGNANCY, UNSPECIFIED, UNSPECIFIED TRIMESTER: ICD-10-CM

## 2024-10-29 PROCEDURE — 99213 OFFICE O/P EST LOW 20 MIN: CPT

## 2024-10-29 PROCEDURE — 87591 N.GONORRHOEAE DNA AMP PROB: CPT

## 2024-10-29 PROCEDURE — 86803 HEPATITIS C AB TEST: CPT

## 2024-10-29 PROCEDURE — 76819 FETAL BIOPHYS PROFIL W/O NST: CPT | Mod: 26

## 2024-10-29 PROCEDURE — 87340 HEPATITIS B SURFACE AG IA: CPT

## 2024-10-29 PROCEDURE — 85027 COMPLETE CBC AUTOMATED: CPT

## 2024-10-29 PROCEDURE — 81002 URINALYSIS NONAUTO W/O SCOPE: CPT

## 2024-10-29 PROCEDURE — 76819 FETAL BIOPHYS PROFIL W/O NST: CPT

## 2024-10-29 PROCEDURE — 87653 STREP B DNA AMP PROBE: CPT

## 2024-10-29 PROCEDURE — 81513 NFCT DS BV RNA VAG FLU ALG: CPT

## 2024-10-29 PROCEDURE — 87389 HIV-1 AG W/HIV-1&-2 AB AG IA: CPT

## 2024-10-29 PROCEDURE — 86780 TREPONEMA PALLIDUM: CPT

## 2024-10-29 PROCEDURE — 87491 CHLMYD TRACH DNA AMP PROBE: CPT

## 2024-10-29 PROCEDURE — 87481 CANDIDA DNA AMP PROBE: CPT

## 2024-10-29 PROCEDURE — 87661 TRICHOMONAS VAGINALIS AMPLIF: CPT

## 2024-10-30 ENCOUNTER — NON-APPOINTMENT (OUTPATIENT)
Age: 34
End: 2024-10-30

## 2024-10-30 DIAGNOSIS — Z34.90 ENCOUNTER FOR SUPERVISION OF NORMAL PREGNANCY, UNSPECIFIED, UNSPECIFIED TRIMESTER: ICD-10-CM

## 2024-10-30 DIAGNOSIS — O99.213 OBESITY COMPLICATING PREGNANCY, THIRD TRIMESTER: ICD-10-CM

## 2024-10-30 DIAGNOSIS — O35.2XX0 MATERNAL CARE FOR (SUSPECTED) HEREDITARY DISEASE IN FETUS, NOT APPLICABLE OR UNSPECIFIED: ICD-10-CM

## 2024-10-30 DIAGNOSIS — Z3A.36 36 WEEKS GESTATION OF PREGNANCY: ICD-10-CM

## 2024-10-30 DIAGNOSIS — O34.219 MATERNAL CARE FOR UNSPECIFIED TYPE SCAR FROM PREVIOUS CESAREAN DELIVERY: ICD-10-CM

## 2024-10-30 DIAGNOSIS — O09.523 SUPERVISION OF ELDERLY MULTIGRAVIDA, THIRD TRIMESTER: ICD-10-CM

## 2024-10-30 DIAGNOSIS — Z14.8 GENETIC CARRIER OF OTHER DISEASE: ICD-10-CM

## 2024-10-30 LAB
BILIRUB UR QL STRIP: NORMAL
CLARITY UR: CLEAR
COLLECTION METHOD: NORMAL
GLUCOSE UR-MCNC: NORMAL
HBV SURFACE AG SER QL: NONREACTIVE
HCG UR QL: 1 EU/DL
HCT VFR BLD CALC: 34.8 %
HCV AB SER QL: NONREACTIVE
HCV S/CO RATIO: 0.04 COI
HGB BLD-MCNC: 11.5 G/DL
HGB UR QL STRIP.AUTO: NORMAL
HIV1+2 AB SPEC QL IA.RAPID: NONREACTIVE
KETONES UR-MCNC: NORMAL
LEUKOCYTE ESTERASE UR QL STRIP: NORMAL
MCHC RBC-ENTMCNC: 25.8 PG
MCHC RBC-ENTMCNC: 33 G/DL
MCV RBC AUTO: 78 FL
NITRITE UR QL STRIP: NORMAL
PH UR STRIP: 6
PLATELET # BLD AUTO: 277 K/UL
PMV BLD AUTO: 0 /100 WBCS
PMV BLD: 11.6 FL
PROT UR STRIP-MCNC: NORMAL
RBC # BLD: 4.46 M/UL
RBC # FLD: 13.4 %
SP GR UR STRIP: 1.01
T PALLIDUM AB SER QL IA: NEGATIVE
WBC # FLD AUTO: 9.59 K/UL

## 2024-11-01 ENCOUNTER — NON-APPOINTMENT (OUTPATIENT)
Age: 34
End: 2024-11-01

## 2024-11-01 ENCOUNTER — OUTPATIENT (OUTPATIENT)
Dept: INPATIENT UNIT | Facility: HOSPITAL | Age: 34
LOS: 1 days | Discharge: HOME | End: 2024-11-01
Payer: MEDICAID

## 2024-11-01 VITALS
HEART RATE: 86 BPM | SYSTOLIC BLOOD PRESSURE: 133 MMHG | DIASTOLIC BLOOD PRESSURE: 63 MMHG | TEMPERATURE: 98 F | RESPIRATION RATE: 16 BRPM

## 2024-11-01 VITALS — DIASTOLIC BLOOD PRESSURE: 63 MMHG | SYSTOLIC BLOOD PRESSURE: 133 MMHG | HEART RATE: 86 BPM

## 2024-11-01 DIAGNOSIS — B37.31 ACUTE CANDIDIASIS OF VULVA AND VAGINA: ICD-10-CM

## 2024-11-01 DIAGNOSIS — O26.899 OTHER SPECIFIED PREGNANCY RELATED CONDITIONS, UNSPECIFIED TRIMESTER: ICD-10-CM

## 2024-11-01 DIAGNOSIS — Z98.891 HISTORY OF UTERINE SCAR FROM PREVIOUS SURGERY: Chronic | ICD-10-CM

## 2024-11-01 PROCEDURE — 87591 N.GONORRHOEAE DNA AMP PROB: CPT

## 2024-11-01 PROCEDURE — 87491 CHLMYD TRACH DNA AMP PROBE: CPT

## 2024-11-01 PROCEDURE — 99214 OFFICE O/P EST MOD 30 MIN: CPT

## 2024-11-01 PROCEDURE — 87798 DETECT AGENT NOS DNA AMP: CPT

## 2024-11-01 PROCEDURE — 87661 TRICHOMONAS VAGINALIS AMPLIF: CPT

## 2024-11-01 PROCEDURE — 83986 ASSAY PH BODY FLUID NOS: CPT

## 2024-11-01 PROCEDURE — 87801 DETECT AGNT MULT DNA AMPLI: CPT

## 2024-11-01 RX ORDER — TERCONAZOLE 4 MG/G
0.4 CREAM VAGINAL
Qty: 1 | Refills: 0 | Status: ACTIVE | COMMUNITY
Start: 2024-11-01 | End: 1900-01-01

## 2024-11-01 NOTE — OB PROVIDER TRIAGE NOTE - NSOBPROVIDERNOTE_OBGYN_ALL_OB_FT
33 y/o  at 36w5d, GBS neg, pregnancy complicated by hx of CS, sickle cell trait, presents for r/o SROM. Pt found to have membranes intact, vaginal discharge likely from candida infection.     -f/u vaginitis swab  -terconazole sent to pharmacy  -C reviewed  -PO intake encouraged  -DC home with strict return precautions

## 2024-11-01 NOTE — OB PROVIDER TRIAGE NOTE - NSHPLABSRESULTS_GEN_ALL_CORE
Labs:  7/30/24  SMA neg  lead <0.1  RPR NR  HCV NR  HepBsAg NR  B+  Ab neg  A1C 5.0  CBC 11.6/34.6    8/5/24      10/29/24  Vaginitis +candida  GBS POS  HepBsAg  RPR NR  HIV NR  HCV NR  CBC 11.5/34.8    Sono:   21w2d: breech, posterior plac, MVP 6.77, anatomy wnl, male fetus  31w2d: vertex, posterior plac, MVP 6.45, 1706g, 33%  35w2d: vertex, posterior plac, MVP 6.28, 2669g, 51%, AC 72%

## 2024-11-01 NOTE — OB PROVIDER TRIAGE NOTE - NS_OBGYNHISTORY_OBGYN_ALL_OB_FT
OBHx:     : Primary CS for twins at 36w due to SROM, largest twin 6-15, denies other complications    GYNHx:  Denies cysts, fibroids, STIs, abnl paps
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
## 2024-11-01 NOTE — OB RN TRIAGE NOTE - FALL HARM RISK - UNIVERSAL INTERVENTIONS
Bed in lowest position, wheels locked, appropriate side rails in place/Call bell, personal items and telephone in reach/Instruct patient to call for assistance before getting out of bed or chair/Non-slip footwear when patient is out of bed/[place] to call system/Physically safe environment - no spills, clutter or unnecessary equipment/Purposeful Proactive Rounding/Room/bathroom lighting operational, light cord in reach

## 2024-11-01 NOTE — OB PROVIDER TRIAGE NOTE - ADDITIONAL INSTRUCTIONS
Please  the 7 days of Terconazole from your pharmacy.   Please return to L&D if you experience any of the following: gushes of fluid from below, mayito vaginal bleeding, contractions that are progressively closer together and increasingly painful, decreased fetal movement

## 2024-11-01 NOTE — OB PROVIDER TRIAGE NOTE - NSHPPHYSICALEXAM_GEN_ALL_CORE
Physical exam:    Vital Signs Last 24 Hrs  T(F): 98 (01 Nov 2024 21:40), Max: 98 (01 Nov 2024 21:40)  HR: 86 (01 Nov 2024 21:50) (86 - 86)  BP: 133/63 (01 Nov 2024 21:50) (133/63 - 133/63)  RR: 16 (01 Nov 2024 21:40) (16 - 16)  SpO2: --    Gen: AAOx3, NAD  Heart: RRR, S1 S2 WNL  Lungs: CTAB  Abdomen: Soft, nontender, no distension, gravid  Ext: No calf tenderness, no swelling    EFM: 145, mod [place], +accels, -decels  toco: none  SVE: 0/0/-3  Speculum: slight pooling of hazy straw colored fluid, large amount of chunky white discharge visualized, cervix not visualized due to discharge  BSS: vertex, anterior placenta, BPP 8/8, MVP 6.5  EFW by leopold: 2800

## 2024-11-02 PROBLEM — Z78.9 OTHER SPECIFIED HEALTH STATUS: Chronic | Status: INACTIVE | Noted: 2020-04-22 | Resolved: 2024-11-01

## 2024-11-04 LAB
BV BACTERIA RRNA VAG QL NAA+PROBE: NOT DETECTED
C GLABRATA RNA VAG QL NAA+PROBE: NOT DETECTED
C TRACH RRNA SPEC QL NAA+PROBE: NOT DETECTED
CANDIDA RRNA VAG QL PROBE: DETECTED
GP B STREP DNA SPEC QL NAA+PROBE: DETECTED
N GONORRHOEA RRNA SPEC QL NAA+PROBE: NOT DETECTED
SOURCE GBS: NORMAL
T VAGINALIS RRNA SPEC QL NAA+PROBE: NOT DETECTED

## 2024-11-05 ENCOUNTER — OUTPATIENT (OUTPATIENT)
Dept: OUTPATIENT SERVICES | Facility: HOSPITAL | Age: 34
LOS: 1 days | End: 2024-11-05
Payer: MEDICAID

## 2024-11-05 ENCOUNTER — APPOINTMENT (OUTPATIENT)
Dept: ANTEPARTUM | Facility: CLINIC | Age: 34
End: 2024-11-05
Payer: MEDICAID

## 2024-11-05 ENCOUNTER — ASOB RESULT (OUTPATIENT)
Age: 34
End: 2024-11-05

## 2024-11-05 ENCOUNTER — APPOINTMENT (OUTPATIENT)
Dept: OBGYN | Facility: CLINIC | Age: 34
End: 2024-11-05
Payer: MEDICAID

## 2024-11-05 VITALS — BODY MASS INDEX: 41.7 KG/M2 | WEIGHT: 266.25 LBS | DIASTOLIC BLOOD PRESSURE: 60 MMHG | SYSTOLIC BLOOD PRESSURE: 120 MMHG

## 2024-11-05 DIAGNOSIS — Z34.90 ENCOUNTER FOR SUPERVISION OF NORMAL PREGNANCY, UNSPECIFIED, UNSPECIFIED TRIMESTER: ICD-10-CM

## 2024-11-05 DIAGNOSIS — Z98.891 HISTORY OF UTERINE SCAR FROM PREVIOUS SURGERY: Chronic | ICD-10-CM

## 2024-11-05 PROCEDURE — 76819 FETAL BIOPHYS PROFIL W/O NST: CPT | Mod: 26

## 2024-11-05 PROCEDURE — 99213 OFFICE O/P EST LOW 20 MIN: CPT

## 2024-11-05 PROCEDURE — 76819 FETAL BIOPHYS PROFIL W/O NST: CPT

## 2024-11-05 PROCEDURE — 81002 URINALYSIS NONAUTO W/O SCOPE: CPT

## 2024-11-06 DIAGNOSIS — O34.219 MATERNAL CARE FOR UNSPECIFIED TYPE SCAR FROM PREVIOUS CESAREAN DELIVERY: ICD-10-CM

## 2024-11-06 DIAGNOSIS — O09.523 SUPERVISION OF ELDERLY MULTIGRAVIDA, THIRD TRIMESTER: ICD-10-CM

## 2024-11-06 DIAGNOSIS — O99.013 ANEMIA COMPLICATING PREGNANCY, THIRD TRIMESTER: ICD-10-CM

## 2024-11-06 DIAGNOSIS — D57.3 SICKLE-CELL TRAIT: ICD-10-CM

## 2024-11-06 DIAGNOSIS — Z03.71 ENCOUNTER FOR SUSPECTED PROBLEM WITH AMNIOTIC CAVITY AND MEMBRANE RULED OUT: ICD-10-CM

## 2024-11-06 DIAGNOSIS — Z3A.36 36 WEEKS GESTATION OF PREGNANCY: ICD-10-CM

## 2024-11-07 LAB
A VAGINAE DNA VAG QL NAA+PROBE: SIGNIFICANT CHANGE UP
BVAB2 DNA VAG QL NAA+PROBE: SIGNIFICANT CHANGE UP
C ALBICANS DNA VAG QL NAA+PROBE: POSITIVE
C GLABRATA DNA VAG QL NAA+PROBE: NEGATIVE — SIGNIFICANT CHANGE UP
C KRUSEI DNA VAG QL NAA+PROBE: NEGATIVE — SIGNIFICANT CHANGE UP
C LUSITANIAE DNA VAG QL NAA+PROBE: NEGATIVE — SIGNIFICANT CHANGE UP
C TRACH RRNA SPEC QL NAA+PROBE: NEGATIVE — SIGNIFICANT CHANGE UP
CANDIDA DNA VAG QL NAA+PROBE: NEGATIVE — SIGNIFICANT CHANGE UP
MEGA1 DNA VAG QL NAA+PROBE: SIGNIFICANT CHANGE UP
N GONORRHOEA RRNA SPEC QL NAA+PROBE: NEGATIVE — SIGNIFICANT CHANGE UP
T VAGINALIS RRNA SPEC QL NAA+PROBE: NEGATIVE — SIGNIFICANT CHANGE UP

## 2024-11-12 ENCOUNTER — ASOB RESULT (OUTPATIENT)
Age: 34
End: 2024-11-12

## 2024-11-12 ENCOUNTER — OUTPATIENT (OUTPATIENT)
Dept: OUTPATIENT SERVICES | Facility: HOSPITAL | Age: 34
LOS: 1 days | End: 2024-11-12
Payer: MEDICAID

## 2024-11-12 ENCOUNTER — APPOINTMENT (OUTPATIENT)
Dept: ANTEPARTUM | Facility: CLINIC | Age: 34
End: 2024-11-12
Payer: MEDICAID

## 2024-11-12 ENCOUNTER — APPOINTMENT (OUTPATIENT)
Dept: OBGYN | Facility: CLINIC | Age: 34
End: 2024-11-12
Payer: MEDICAID

## 2024-11-12 VITALS — SYSTOLIC BLOOD PRESSURE: 120 MMHG | DIASTOLIC BLOOD PRESSURE: 70 MMHG

## 2024-11-12 VITALS
BODY MASS INDEX: 42.69 KG/M2 | WEIGHT: 272 LBS | HEART RATE: 98 BPM | DIASTOLIC BLOOD PRESSURE: 84 MMHG | HEIGHT: 67 IN | SYSTOLIC BLOOD PRESSURE: 142 MMHG

## 2024-11-12 DIAGNOSIS — Z98.891 HISTORY OF UTERINE SCAR FROM PREVIOUS SURGERY: Chronic | ICD-10-CM

## 2024-11-12 DIAGNOSIS — Z34.90 ENCOUNTER FOR SUPERVISION OF NORMAL PREGNANCY, UNSPECIFIED, UNSPECIFIED TRIMESTER: ICD-10-CM

## 2024-11-12 LAB
BILIRUB UR QL STRIP: NORMAL
CLARITY UR: CLEAR
COLLECTION METHOD: NORMAL
GLUCOSE UR-MCNC: NORMAL
HCG UR QL: 0.2 EU/DL
HGB UR QL STRIP.AUTO: NORMAL
KETONES UR-MCNC: NORMAL
LEUKOCYTE ESTERASE UR QL STRIP: NORMAL
NITRITE UR QL STRIP: NORMAL
PH UR STRIP: 7
PROT UR STRIP-MCNC: NORMAL
SP GR UR STRIP: 1.01

## 2024-11-12 PROCEDURE — 99213 OFFICE O/P EST LOW 20 MIN: CPT

## 2024-11-12 PROCEDURE — 76819 FETAL BIOPHYS PROFIL W/O NST: CPT

## 2024-11-12 PROCEDURE — 81002 URINALYSIS NONAUTO W/O SCOPE: CPT

## 2024-11-12 PROCEDURE — 76819 FETAL BIOPHYS PROFIL W/O NST: CPT | Mod: 26

## 2024-11-13 DIAGNOSIS — Z34.90 ENCOUNTER FOR SUPERVISION OF NORMAL PREGNANCY, UNSPECIFIED, UNSPECIFIED TRIMESTER: ICD-10-CM

## 2024-11-14 ENCOUNTER — INPATIENT (INPATIENT)
Facility: HOSPITAL | Age: 34
LOS: 1 days | Discharge: HOME | DRG: 540 | End: 2024-11-16
Attending: OBSTETRICS & GYNECOLOGY | Admitting: OBSTETRICS & GYNECOLOGY
Payer: MEDICAID

## 2024-11-14 VITALS — TEMPERATURE: 98 F | RESPIRATION RATE: 16 BRPM | SYSTOLIC BLOOD PRESSURE: 139 MMHG | DIASTOLIC BLOOD PRESSURE: 96 MMHG

## 2024-11-14 DIAGNOSIS — Z98.891 HISTORY OF UTERINE SCAR FROM PREVIOUS SURGERY: Chronic | ICD-10-CM

## 2024-11-14 DIAGNOSIS — O26.899 OTHER SPECIFIED PREGNANCY RELATED CONDITIONS, UNSPECIFIED TRIMESTER: ICD-10-CM

## 2024-11-14 DIAGNOSIS — E66.01 MORBID (SEVERE) OBESITY DUE TO EXCESS CALORIES: ICD-10-CM

## 2024-11-14 DIAGNOSIS — O35.2XX0 MATERNAL CARE FOR (SUSPECTED) HEREDITARY DISEASE IN FETUS, NOT APPLICABLE OR UNSPECIFIED: ICD-10-CM

## 2024-11-14 DIAGNOSIS — O99.213 OBESITY COMPLICATING PREGNANCY, THIRD TRIMESTER: ICD-10-CM

## 2024-11-14 DIAGNOSIS — Z14.8 GENETIC CARRIER OF OTHER DISEASE: ICD-10-CM

## 2024-11-14 DIAGNOSIS — Z3A.38 38 WEEKS GESTATION OF PREGNANCY: ICD-10-CM

## 2024-11-14 PROBLEM — Z86.2 PERSONAL HISTORY OF DISEASES OF THE BLOOD AND BLOOD-FORMING ORGANS AND CERTAIN DISORDERS INVOLVING THE IMMUNE MECHANISM: Chronic | Status: ACTIVE | Noted: 2024-11-01

## 2024-11-14 LAB
ALBUMIN SERPL ELPH-MCNC: 3.4 G/DL — LOW (ref 3.5–5.2)
ALP SERPL-CCNC: 145 U/L — HIGH (ref 30–115)
ALT FLD-CCNC: 25 U/L — SIGNIFICANT CHANGE UP (ref 0–41)
AMPHET UR-MCNC: NEGATIVE — SIGNIFICANT CHANGE UP
ANION GAP SERPL CALC-SCNC: 11 MMOL/L — SIGNIFICANT CHANGE UP (ref 7–14)
APPEARANCE UR: CLEAR — SIGNIFICANT CHANGE UP
AST SERPL-CCNC: 37 U/L — SIGNIFICANT CHANGE UP (ref 0–41)
BARBITURATES UR SCN-MCNC: NEGATIVE — SIGNIFICANT CHANGE UP
BASOPHILS # BLD AUTO: 0.02 K/UL — SIGNIFICANT CHANGE UP (ref 0–0.2)
BASOPHILS NFR BLD AUTO: 0.2 % — SIGNIFICANT CHANGE UP (ref 0–1)
BENZODIAZ UR-MCNC: NEGATIVE — SIGNIFICANT CHANGE UP
BILIRUB SERPL-MCNC: 0.3 MG/DL — SIGNIFICANT CHANGE UP (ref 0.2–1.2)
BILIRUB UR-MCNC: NEGATIVE — SIGNIFICANT CHANGE UP
BLD GP AB SCN SERPL QL: SIGNIFICANT CHANGE UP
BUN SERPL-MCNC: 6 MG/DL — LOW (ref 10–20)
BUPRENORPHINE SCREEN, URINE RESULT: NEGATIVE — SIGNIFICANT CHANGE UP
CALCIUM SERPL-MCNC: 9.2 MG/DL — SIGNIFICANT CHANGE UP (ref 8.4–10.5)
CHLORIDE SERPL-SCNC: 103 MMOL/L — SIGNIFICANT CHANGE UP (ref 98–110)
CO2 SERPL-SCNC: 21 MMOL/L — SIGNIFICANT CHANGE UP (ref 17–32)
COCAINE METAB.OTHER UR-MCNC: NEGATIVE — SIGNIFICANT CHANGE UP
COLOR SPEC: YELLOW — SIGNIFICANT CHANGE UP
CREAT ?TM UR-MCNC: 65 MG/DL — SIGNIFICANT CHANGE UP
CREAT SERPL-MCNC: 0.6 MG/DL — LOW (ref 0.7–1.5)
DIFF PNL FLD: NEGATIVE — SIGNIFICANT CHANGE UP
EGFR: 120 ML/MIN/1.73M2 — SIGNIFICANT CHANGE UP
EGFR: 120 ML/MIN/1.73M2 — SIGNIFICANT CHANGE UP
EOSINOPHIL # BLD AUTO: 0.08 K/UL — SIGNIFICANT CHANGE UP (ref 0–0.7)
EOSINOPHIL NFR BLD AUTO: 0.9 % — SIGNIFICANT CHANGE UP (ref 0–8)
FENTANYL UR QL: NEGATIVE — SIGNIFICANT CHANGE UP
GLUCOSE SERPL-MCNC: 74 MG/DL — SIGNIFICANT CHANGE UP (ref 70–99)
GLUCOSE UR QL: NEGATIVE MG/DL — SIGNIFICANT CHANGE UP
HCT VFR BLD CALC: 36.3 % — LOW (ref 37–47)
HGB BLD-MCNC: 11.9 G/DL — LOW (ref 12–16)
IMM GRANULOCYTES NFR BLD AUTO: 0.9 % — HIGH (ref 0.1–0.3)
KETONES UR-MCNC: NEGATIVE MG/DL — SIGNIFICANT CHANGE UP
L&D DRUG SCREEN, URINE: SIGNIFICANT CHANGE UP
LDH SERPL L TO P-CCNC: 370 — HIGH (ref 50–242)
LEUKOCYTE ESTERASE UR-ACNC: NEGATIVE — SIGNIFICANT CHANGE UP
LYMPHOCYTES # BLD AUTO: 1.94 K/UL — SIGNIFICANT CHANGE UP (ref 1.2–3.4)
LYMPHOCYTES # BLD AUTO: 21.7 % — SIGNIFICANT CHANGE UP (ref 20.5–51.1)
MCHC RBC-ENTMCNC: 25.8 PG — LOW (ref 27–31)
MCHC RBC-ENTMCNC: 32.8 G/DL — SIGNIFICANT CHANGE UP (ref 32–37)
MCV RBC AUTO: 78.7 FL — LOW (ref 81–99)
METHADONE UR-MCNC: NEGATIVE — SIGNIFICANT CHANGE UP
MONOCYTES # BLD AUTO: 0.55 K/UL — SIGNIFICANT CHANGE UP (ref 0.1–0.6)
MONOCYTES NFR BLD AUTO: 6.1 % — SIGNIFICANT CHANGE UP (ref 1.7–9.3)
NEUTROPHILS # BLD AUTO: 6.29 K/UL — SIGNIFICANT CHANGE UP (ref 1.4–6.5)
NEUTROPHILS NFR BLD AUTO: 70.2 % — SIGNIFICANT CHANGE UP (ref 42.2–75.2)
NITRITE UR-MCNC: NEGATIVE — SIGNIFICANT CHANGE UP
NRBC # BLD: 0 /100 WBCS — SIGNIFICANT CHANGE UP (ref 0–0)
NRBC BLD-RTO: 0 /100 WBCS — SIGNIFICANT CHANGE UP (ref 0–0)
OPIATES UR-MCNC: NEGATIVE — SIGNIFICANT CHANGE UP
OXYCODONE UR-MCNC: NEGATIVE — SIGNIFICANT CHANGE UP
PCP UR-MCNC: NEGATIVE — SIGNIFICANT CHANGE UP
PH UR: 6 — SIGNIFICANT CHANGE UP (ref 5–8)
PLATELET # BLD AUTO: 262 K/UL — SIGNIFICANT CHANGE UP (ref 130–400)
PMV BLD: 10.9 FL — HIGH (ref 7.4–10.4)
POTASSIUM SERPL-MCNC: 4.2 MMOL/L — SIGNIFICANT CHANGE UP (ref 3.5–5)
POTASSIUM SERPL-SCNC: 4.2 MMOL/L — SIGNIFICANT CHANGE UP (ref 3.5–5)
PRENATAL SYPHILIS TEST: SIGNIFICANT CHANGE UP
PROPOXYPHENE QUALITATIVE URINE RESULT: NEGATIVE — SIGNIFICANT CHANGE UP
PROT ?TM UR-MCNC: 16 MG/DLG/24H — SIGNIFICANT CHANGE UP
PROT SERPL-MCNC: 6.6 G/DL — SIGNIFICANT CHANGE UP (ref 6–8)
PROT UR-MCNC: NEGATIVE MG/DL — SIGNIFICANT CHANGE UP
PROT/CREAT UR-RTO: 0.2 RATIO — SIGNIFICANT CHANGE UP (ref 0–0.2)
RBC # BLD: 4.61 M/UL — SIGNIFICANT CHANGE UP (ref 4.2–5.4)
RBC # FLD: 14.3 % — SIGNIFICANT CHANGE UP (ref 11.5–14.5)
SODIUM SERPL-SCNC: 135 MMOL/L — SIGNIFICANT CHANGE UP (ref 135–146)
SP GR SPEC: 1.01 — SIGNIFICANT CHANGE UP (ref 1–1.03)
URATE SERPL-MCNC: 6.1 MG/DL — SIGNIFICANT CHANGE UP (ref 2.5–7)
UROBILINOGEN FLD QL: 0.2 MG/DL — SIGNIFICANT CHANGE UP (ref 0.2–1)
WBC # BLD: 8.96 K/UL — SIGNIFICANT CHANGE UP (ref 4.8–10.8)
WBC # FLD AUTO: 8.96 K/UL — SIGNIFICANT CHANGE UP (ref 4.8–10.8)

## 2024-11-14 PROCEDURE — 86900 BLOOD TYPING SEROLOGIC ABO: CPT

## 2024-11-14 PROCEDURE — 81003 URINALYSIS AUTO W/O SCOPE: CPT

## 2024-11-14 PROCEDURE — 85025 COMPLETE CBC W/AUTO DIFF WBC: CPT

## 2024-11-14 PROCEDURE — 84156 ASSAY OF PROTEIN URINE: CPT

## 2024-11-14 PROCEDURE — 86901 BLOOD TYPING SEROLOGIC RH(D): CPT

## 2024-11-14 PROCEDURE — 59025 FETAL NON-STRESS TEST: CPT

## 2024-11-14 PROCEDURE — 36415 COLL VENOUS BLD VENIPUNCTURE: CPT

## 2024-11-14 PROCEDURE — 86850 RBC ANTIBODY SCREEN: CPT

## 2024-11-14 PROCEDURE — 80354 DRUG SCREENING FENTANYL: CPT

## 2024-11-14 PROCEDURE — 86592 SYPHILIS TEST NON-TREP QUAL: CPT

## 2024-11-14 PROCEDURE — 83615 LACTATE (LD) (LDH) ENZYME: CPT

## 2024-11-14 PROCEDURE — 84550 ASSAY OF BLOOD/URIC ACID: CPT

## 2024-11-14 PROCEDURE — 80053 COMPREHEN METABOLIC PANEL: CPT

## 2024-11-14 PROCEDURE — 82570 ASSAY OF URINE CREATININE: CPT

## 2024-11-14 PROCEDURE — 80307 DRUG TEST PRSMV CHEM ANLYZR: CPT

## 2024-11-14 PROCEDURE — 59514 CESAREAN DELIVERY ONLY: CPT | Mod: U7

## 2024-11-14 RX ORDER — OXYTOCIN-SODIUM CHLORIDE 0.9% IV SOLN 30 UNIT/500ML 30-0.9/5 UT/ML-%
167 SOLUTION INTRAVENOUS
Qty: 30 | Refills: 0 | Status: DISCONTINUED | OUTPATIENT
Start: 2024-11-14 | End: 2024-11-16

## 2024-11-14 RX ORDER — ACETAMINOPHEN 500 MG/5ML
975 LIQUID (ML) ORAL
Refills: 0 | Status: DISCONTINUED | OUTPATIENT
Start: 2024-11-14 | End: 2024-11-16

## 2024-11-14 RX ORDER — ENOXAPARIN SODIUM 100 MG/ML
40 INJECTION SUBCUTANEOUS EVERY 24 HOURS
Refills: 0 | Status: DISCONTINUED | OUTPATIENT
Start: 2024-11-15 | End: 2024-11-16

## 2024-11-14 RX ORDER — DEXAMETHASONE 0.5 MG/1
4 TABLET ORAL EVERY 6 HOURS
Refills: 0 | Status: DISCONTINUED | OUTPATIENT
Start: 2024-11-14 | End: 2024-11-16

## 2024-11-14 RX ORDER — MAGNESIUM HYDROXIDE 400 MG/5ML
30 SUSPENSION ORAL
Refills: 0 | Status: DISCONTINUED | OUTPATIENT
Start: 2024-11-14 | End: 2024-11-16

## 2024-11-14 RX ORDER — SODIUM CHLORIDE 9 G/1000ML
1000 INJECTION, SOLUTION INTRAVENOUS
Refills: 0 | Status: DISCONTINUED | OUTPATIENT
Start: 2024-11-14 | End: 2024-11-14

## 2024-11-14 RX ORDER — ONDANSETRON HCL/PF 4 MG/2 ML
4 VIAL (ML) INJECTION EVERY 6 HOURS
Refills: 0 | Status: DISCONTINUED | OUTPATIENT
Start: 2024-11-14 | End: 2024-11-16

## 2024-11-14 RX ORDER — MODIFIED LANOLIN 100 %
1 CREAM (GRAM) TOPICAL EVERY 6 HOURS
Refills: 0 | Status: DISCONTINUED | OUTPATIENT
Start: 2024-11-14 | End: 2024-11-16

## 2024-11-14 RX ORDER — SODIUM CHLORIDE 9 G/1000ML
1000 INJECTION, SOLUTION INTRAVENOUS
Refills: 0 | Status: DISCONTINUED | OUTPATIENT
Start: 2024-11-14 | End: 2024-11-16

## 2024-11-14 RX ORDER — SIMETHICONE 80 MG
80 TABLET,CHEWABLE ORAL EVERY 4 HOURS
Refills: 0 | Status: DISCONTINUED | OUTPATIENT
Start: 2024-11-14 | End: 2024-11-16

## 2024-11-14 RX ORDER — OXYCODONE HYDROCHLORIDE 30 MG/1
5 TABLET ORAL ONCE
Refills: 0 | Status: DISCONTINUED | OUTPATIENT
Start: 2024-11-14 | End: 2024-11-16

## 2024-11-14 RX ORDER — OXYCODONE HYDROCHLORIDE 30 MG/1
5 TABLET ORAL
Refills: 0 | Status: DISCONTINUED | OUTPATIENT
Start: 2024-11-14 | End: 2024-11-16

## 2024-11-14 RX ORDER — NALOXONE HYDROCHLORIDE 0.4 MG/ML
0.1 INJECTION, SOLUTION INTRAMUSCULAR; INTRAVENOUS; SUBCUTANEOUS
Refills: 0 | Status: DISCONTINUED | OUTPATIENT
Start: 2024-11-14 | End: 2024-11-16

## 2024-11-14 RX ORDER — KETOROLAC TROMETHAMINE 30 MG/ML
30 INJECTION, SOLUTION INTRAMUSCULAR; INTRAVENOUS EVERY 6 HOURS
Refills: 0 | Status: DISCONTINUED | OUTPATIENT
Start: 2024-11-14 | End: 2024-11-15

## 2024-11-14 RX ORDER — DIPHENHYDRAMINE HCL 12.5MG/5ML
25 ELIXIR ORAL EVERY 6 HOURS
Refills: 0 | Status: DISCONTINUED | OUTPATIENT
Start: 2024-11-14 | End: 2024-11-16

## 2024-11-14 RX ORDER — IBUPROFEN 200 MG
600 TABLET ORAL EVERY 6 HOURS
Refills: 0 | Status: COMPLETED | OUTPATIENT
Start: 2024-11-14 | End: 2025-10-13

## 2024-11-14 RX ORDER — CEFAZOLIN SODIUM IN 0.9 % NACL 3 G/100 ML
3000 INTRAVENOUS SOLUTION, PIGGYBACK (ML) INTRAVENOUS ONCE
Refills: 0 | Status: COMPLETED | OUTPATIENT
Start: 2024-11-14 | End: 2024-11-14

## 2024-11-14 RX ORDER — CEFAZOLIN SODIUM IN 0.9 % NACL 3 G/100 ML
2000 INTRAVENOUS SOLUTION, PIGGYBACK (ML) INTRAVENOUS ONCE
Refills: 0 | Status: DISCONTINUED | OUTPATIENT
Start: 2024-11-14 | End: 2024-11-14

## 2024-11-14 RX ADMIN — KETOROLAC TROMETHAMINE 30 MILLIGRAM(S): 30 INJECTION, SOLUTION INTRAMUSCULAR; INTRAVENOUS at 21:07

## 2024-11-14 RX ADMIN — Medication 975 MILLIGRAM(S): at 23:57

## 2024-11-14 RX ADMIN — Medication 975 MILLIGRAM(S): at 18:37

## 2024-11-14 RX ADMIN — KETOROLAC TROMETHAMINE 30 MILLIGRAM(S): 30 INJECTION, SOLUTION INTRAMUSCULAR; INTRAVENOUS at 16:19

## 2024-11-14 RX ADMIN — Medication 200 MILLIGRAM(S): at 12:25

## 2024-11-15 LAB
BASOPHILS # BLD AUTO: 0.01 K/UL — SIGNIFICANT CHANGE UP (ref 0–0.2)
BASOPHILS NFR BLD AUTO: 0.1 % — SIGNIFICANT CHANGE UP (ref 0–1)
EOSINOPHIL # BLD AUTO: 0.02 K/UL — SIGNIFICANT CHANGE UP (ref 0–0.7)
EOSINOPHIL NFR BLD AUTO: 0.1 % — SIGNIFICANT CHANGE UP (ref 0–8)
HCT VFR BLD CALC: 33.2 % — LOW (ref 37–47)
HGB BLD-MCNC: 10.8 G/DL — LOW (ref 12–16)
IMM GRANULOCYTES NFR BLD AUTO: 0.6 % — HIGH (ref 0.1–0.3)
LYMPHOCYTES # BLD AUTO: 1.99 K/UL — SIGNIFICANT CHANGE UP (ref 1.2–3.4)
LYMPHOCYTES # BLD AUTO: 11.8 % — LOW (ref 20.5–51.1)
MCHC RBC-ENTMCNC: 25.6 PG — LOW (ref 27–31)
MCHC RBC-ENTMCNC: 32.5 G/DL — SIGNIFICANT CHANGE UP (ref 32–37)
MCV RBC AUTO: 78.7 FL — LOW (ref 81–99)
MONOCYTES # BLD AUTO: 1.34 K/UL — HIGH (ref 0.1–0.6)
MONOCYTES NFR BLD AUTO: 7.9 % — SIGNIFICANT CHANGE UP (ref 1.7–9.3)
NEUTROPHILS # BLD AUTO: 13.45 K/UL — HIGH (ref 1.4–6.5)
NEUTROPHILS NFR BLD AUTO: 79.5 % — HIGH (ref 42.2–75.2)
NRBC # BLD: 0 /100 WBCS — SIGNIFICANT CHANGE UP (ref 0–0)
NRBC BLD-RTO: 0 /100 WBCS — SIGNIFICANT CHANGE UP (ref 0–0)
PLATELET # BLD AUTO: 266 K/UL — SIGNIFICANT CHANGE UP (ref 130–400)
PMV BLD: 11.6 FL — HIGH (ref 7.4–10.4)
RBC # BLD: 4.22 M/UL — SIGNIFICANT CHANGE UP (ref 4.2–5.4)
RBC # FLD: 14.3 % — SIGNIFICANT CHANGE UP (ref 11.5–14.5)
WBC # BLD: 16.91 K/UL — HIGH (ref 4.8–10.8)
WBC # FLD AUTO: 16.91 K/UL — HIGH (ref 4.8–10.8)

## 2024-11-15 PROCEDURE — 99231 SBSQ HOSP IP/OBS SF/LOW 25: CPT

## 2024-11-15 RX ORDER — IBUPROFEN 200 MG
600 TABLET ORAL EVERY 6 HOURS
Refills: 0 | Status: DISCONTINUED | OUTPATIENT
Start: 2024-11-15 | End: 2024-11-16

## 2024-11-15 RX ADMIN — Medication 600 MILLIGRAM(S): at 16:30

## 2024-11-15 RX ADMIN — Medication 975 MILLIGRAM(S): at 14:29

## 2024-11-15 RX ADMIN — Medication 975 MILLIGRAM(S): at 12:34

## 2024-11-15 RX ADMIN — ENOXAPARIN SODIUM 40 MILLIGRAM(S): 100 INJECTION SUBCUTANEOUS at 02:04

## 2024-11-15 RX ADMIN — Medication 975 MILLIGRAM(S): at 17:54

## 2024-11-15 RX ADMIN — Medication 600 MILLIGRAM(S): at 15:30

## 2024-11-15 RX ADMIN — Medication 600 MILLIGRAM(S): at 21:39

## 2024-11-15 RX ADMIN — Medication 975 MILLIGRAM(S): at 05:02

## 2024-11-15 RX ADMIN — Medication 600 MILLIGRAM(S): at 22:00

## 2024-11-16 ENCOUNTER — TRANSCRIPTION ENCOUNTER (OUTPATIENT)
Age: 34
End: 2024-11-16

## 2024-11-16 VITALS
RESPIRATION RATE: 18 BRPM | HEART RATE: 88 BPM | TEMPERATURE: 98 F | SYSTOLIC BLOOD PRESSURE: 122 MMHG | OXYGEN SATURATION: 99 % | DIASTOLIC BLOOD PRESSURE: 63 MMHG

## 2024-11-16 DIAGNOSIS — O13.9 GESTATIONAL [PREGNANCY-INDUCED] HYPERTENSION W/OUT SIGNIFICANT PROTEINURIA, UNSPECIFIED TRIMESTER: ICD-10-CM

## 2024-11-16 PROCEDURE — 99238 HOSP IP/OBS DSCHRG MGMT 30/<: CPT

## 2024-11-16 RX ORDER — SIMETHICONE 80 MG
1 TABLET,CHEWABLE ORAL
Qty: 0 | Refills: 0 | DISCHARGE
Start: 2024-11-16

## 2024-11-16 RX ORDER — OXYCODONE HYDROCHLORIDE 30 MG/1
1 TABLET ORAL
Qty: 0 | Refills: 0 | DISCHARGE
Start: 2024-11-16

## 2024-11-16 RX ORDER — IBUPROFEN 200 MG
1 TABLET ORAL
Qty: 0 | Refills: 0 | DISCHARGE
Start: 2024-11-16

## 2024-11-16 RX ORDER — MODIFIED LANOLIN 100 %
1 CREAM (GRAM) TOPICAL
Qty: 0 | Refills: 0 | DISCHARGE
Start: 2024-11-16

## 2024-11-16 RX ORDER — ACETAMINOPHEN 500 MG/5ML
3 LIQUID (ML) ORAL
Qty: 0 | Refills: 0 | DISCHARGE
Start: 2024-11-16

## 2024-11-16 RX ADMIN — Medication 600 MILLIGRAM(S): at 09:39

## 2024-11-16 RX ADMIN — Medication 600 MILLIGRAM(S): at 02:35

## 2024-11-16 RX ADMIN — ENOXAPARIN SODIUM 40 MILLIGRAM(S): 100 INJECTION SUBCUTANEOUS at 02:35

## 2024-11-16 RX ADMIN — Medication 975 MILLIGRAM(S): at 06:13

## 2024-11-16 RX ADMIN — Medication 600 MILLIGRAM(S): at 02:57

## 2024-11-16 RX ADMIN — Medication 975 MILLIGRAM(S): at 00:25

## 2024-11-16 RX ADMIN — Medication 975 MILLIGRAM(S): at 01:00

## 2024-11-17 RX ORDER — IBUPROFEN 200 MG
1 TABLET ORAL
Qty: 56 | Refills: 0
Start: 2024-11-17 | End: 2024-11-30

## 2024-11-17 RX ORDER — ACETAMINOPHEN 500 MG/5ML
2 LIQUID (ML) ORAL
Qty: 112 | Refills: 0
Start: 2024-11-17 | End: 2024-11-30

## 2024-11-18 ENCOUNTER — NON-APPOINTMENT (OUTPATIENT)
Age: 34
End: 2024-11-18

## 2024-11-19 ENCOUNTER — RESULT REVIEW (OUTPATIENT)
Age: 34
End: 2024-11-19

## 2024-11-19 ENCOUNTER — INPATIENT (INPATIENT)
Facility: HOSPITAL | Age: 34
LOS: 0 days | Discharge: HOME | DRG: 561 | End: 2024-11-20
Attending: OBSTETRICS & GYNECOLOGY | Admitting: OBSTETRICS & GYNECOLOGY
Payer: MEDICAID

## 2024-11-19 ENCOUNTER — OUTPATIENT (OUTPATIENT)
Dept: OUTPATIENT SERVICES | Facility: HOSPITAL | Age: 34
LOS: 1 days | End: 2024-11-19

## 2024-11-19 ENCOUNTER — APPOINTMENT (OUTPATIENT)
Dept: OBGYN | Facility: CLINIC | Age: 34
End: 2024-11-19

## 2024-11-19 ENCOUNTER — APPOINTMENT (OUTPATIENT)
Dept: ANTEPARTUM | Facility: CLINIC | Age: 34
End: 2024-11-19

## 2024-11-19 VITALS — SYSTOLIC BLOOD PRESSURE: 158 MMHG | DIASTOLIC BLOOD PRESSURE: 80 MMHG | HEART RATE: 45 BPM | OXYGEN SATURATION: 97 %

## 2024-11-19 VITALS — OXYGEN SATURATION: 97 %

## 2024-11-19 DIAGNOSIS — O26.899 OTHER SPECIFIED PREGNANCY RELATED CONDITIONS, UNSPECIFIED TRIMESTER: ICD-10-CM

## 2024-11-19 DIAGNOSIS — Z34.90 ENCOUNTER FOR SUPERVISION OF NORMAL PREGNANCY, UNSPECIFIED, UNSPECIFIED TRIMESTER: ICD-10-CM

## 2024-11-19 DIAGNOSIS — Z98.891 HISTORY OF UTERINE SCAR FROM PREVIOUS SURGERY: Chronic | ICD-10-CM

## 2024-11-19 DIAGNOSIS — R03.0 ELEVATED BLOOD-PRESSURE READING, WITHOUT DIAGNOSIS OF HYPERTENSION: ICD-10-CM

## 2024-11-19 LAB
ALBUMIN SERPL ELPH-MCNC: 3.4 G/DL — LOW (ref 3.5–5.2)
ALBUMIN SERPL ELPH-MCNC: 3.5 G/DL — SIGNIFICANT CHANGE UP (ref 3.5–5.2)
ALBUMIN SERPL ELPH-MCNC: 3.7 G/DL — SIGNIFICANT CHANGE UP (ref 3.5–5.2)
ALP SERPL-CCNC: 111 U/L — SIGNIFICANT CHANGE UP (ref 30–115)
ALP SERPL-CCNC: 117 U/L — HIGH (ref 30–115)
ALP SERPL-CCNC: 127 U/L — HIGH (ref 30–115)
ALT FLD-CCNC: 56 U/L — HIGH (ref 0–41)
ALT FLD-CCNC: 58 U/L — HIGH (ref 0–41)
ALT FLD-CCNC: 60 U/L — HIGH (ref 0–41)
ANION GAP SERPL CALC-SCNC: 10 MMOL/L — SIGNIFICANT CHANGE UP (ref 7–14)
ANION GAP SERPL CALC-SCNC: 14 MMOL/L — SIGNIFICANT CHANGE UP (ref 7–14)
ANION GAP SERPL CALC-SCNC: 14 MMOL/L — SIGNIFICANT CHANGE UP (ref 7–14)
APTT BLD: 27.8 SEC — SIGNIFICANT CHANGE UP (ref 27–39.2)
AST SERPL-CCNC: 45 U/L — HIGH (ref 0–41)
AST SERPL-CCNC: 45 U/L — HIGH (ref 0–41)
AST SERPL-CCNC: 46 U/L — HIGH (ref 0–41)
BASOPHILS # BLD AUTO: 0.02 K/UL — SIGNIFICANT CHANGE UP (ref 0–0.2)
BASOPHILS # BLD AUTO: 0.03 K/UL — SIGNIFICANT CHANGE UP (ref 0–0.2)
BASOPHILS NFR BLD AUTO: 0.2 % — SIGNIFICANT CHANGE UP (ref 0–1)
BASOPHILS NFR BLD AUTO: 0.3 % — SIGNIFICANT CHANGE UP (ref 0–1)
BILIRUB SERPL-MCNC: 0.3 MG/DL — SIGNIFICANT CHANGE UP (ref 0.2–1.2)
BILIRUB SERPL-MCNC: 0.3 MG/DL — SIGNIFICANT CHANGE UP (ref 0.2–1.2)
BILIRUB SERPL-MCNC: 0.4 MG/DL — SIGNIFICANT CHANGE UP (ref 0.2–1.2)
BUN SERPL-MCNC: 11 MG/DL — SIGNIFICANT CHANGE UP (ref 10–20)
BUN SERPL-MCNC: 12 MG/DL — SIGNIFICANT CHANGE UP (ref 10–20)
BUN SERPL-MCNC: 13 MG/DL — SIGNIFICANT CHANGE UP (ref 10–20)
CALCIUM SERPL-MCNC: 8.3 MG/DL — LOW (ref 8.4–10.5)
CALCIUM SERPL-MCNC: 8.7 MG/DL — SIGNIFICANT CHANGE UP (ref 8.4–10.5)
CALCIUM SERPL-MCNC: 9.3 MG/DL — SIGNIFICANT CHANGE UP (ref 8.4–10.5)
CHLORIDE SERPL-SCNC: 101 MMOL/L — SIGNIFICANT CHANGE UP (ref 98–110)
CHLORIDE SERPL-SCNC: 102 MMOL/L — SIGNIFICANT CHANGE UP (ref 98–110)
CHLORIDE SERPL-SCNC: 106 MMOL/L — SIGNIFICANT CHANGE UP (ref 98–110)
CO2 SERPL-SCNC: 20 MMOL/L — SIGNIFICANT CHANGE UP (ref 17–32)
CO2 SERPL-SCNC: 22 MMOL/L — SIGNIFICANT CHANGE UP (ref 17–32)
CO2 SERPL-SCNC: 26 MMOL/L — SIGNIFICANT CHANGE UP (ref 17–32)
CREAT SERPL-MCNC: 0.8 MG/DL — SIGNIFICANT CHANGE UP (ref 0.7–1.5)
D DIMER BLD IA.RAPID-MCNC: 1901 NG/ML DDU — HIGH
EGFR: 98 ML/MIN/1.73M2 — SIGNIFICANT CHANGE UP
EOSINOPHIL # BLD AUTO: 0.15 K/UL — SIGNIFICANT CHANGE UP (ref 0–0.7)
EOSINOPHIL # BLD AUTO: 0.2 K/UL — SIGNIFICANT CHANGE UP (ref 0–0.7)
EOSINOPHIL NFR BLD AUTO: 1.3 % — SIGNIFICANT CHANGE UP (ref 0–8)
EOSINOPHIL NFR BLD AUTO: 2 % — SIGNIFICANT CHANGE UP (ref 0–8)
FIBRINOGEN PPP-MCNC: 513 MG/DL — HIGH (ref 200–435)
GLUCOSE SERPL-MCNC: 73 MG/DL — SIGNIFICANT CHANGE UP (ref 70–99)
GLUCOSE SERPL-MCNC: 86 MG/DL — SIGNIFICANT CHANGE UP (ref 70–99)
GLUCOSE SERPL-MCNC: 98 MG/DL — SIGNIFICANT CHANGE UP (ref 70–99)
HCT VFR BLD CALC: 33.9 % — LOW (ref 37–47)
HCT VFR BLD CALC: 37.2 % — SIGNIFICANT CHANGE UP (ref 37–47)
HGB BLD-MCNC: 11.3 G/DL — LOW (ref 12–16)
HGB BLD-MCNC: 12.5 G/DL — SIGNIFICANT CHANGE UP (ref 12–16)
IMM GRANULOCYTES NFR BLD AUTO: 0.6 % — HIGH (ref 0.1–0.3)
IMM GRANULOCYTES NFR BLD AUTO: 1.3 % — HIGH (ref 0.1–0.3)
INR BLD: 0.93 RATIO — SIGNIFICANT CHANGE UP (ref 0.65–1.3)
LACTATE SERPL-SCNC: 1.3 MMOL/L — SIGNIFICANT CHANGE UP (ref 0.7–2)
LDH SERPL L TO P-CCNC: 424 — HIGH (ref 50–242)
LDH SERPL L TO P-CCNC: 449 — HIGH (ref 50–242)
LDH SERPL L TO P-CCNC: 471 — HIGH (ref 50–242)
LYMPHOCYTES # BLD AUTO: 1.95 K/UL — SIGNIFICANT CHANGE UP (ref 1.2–3.4)
LYMPHOCYTES # BLD AUTO: 17.1 % — LOW (ref 20.5–51.1)
LYMPHOCYTES # BLD AUTO: 2.57 K/UL — SIGNIFICANT CHANGE UP (ref 1.2–3.4)
LYMPHOCYTES # BLD AUTO: 25.1 % — SIGNIFICANT CHANGE UP (ref 20.5–51.1)
MAGNESIUM SERPL-MCNC: 3.8 MG/DL — CRITICAL HIGH (ref 1.8–2.4)
MCHC RBC-ENTMCNC: 26 PG — LOW (ref 27–31)
MCHC RBC-ENTMCNC: 26.1 PG — LOW (ref 27–31)
MCHC RBC-ENTMCNC: 33.3 G/DL — SIGNIFICANT CHANGE UP (ref 32–37)
MCHC RBC-ENTMCNC: 33.6 G/DL — SIGNIFICANT CHANGE UP (ref 32–37)
MCV RBC AUTO: 77.3 FL — LOW (ref 81–99)
MCV RBC AUTO: 78.3 FL — LOW (ref 81–99)
MONOCYTES # BLD AUTO: 0.61 K/UL — HIGH (ref 0.1–0.6)
MONOCYTES # BLD AUTO: 0.72 K/UL — HIGH (ref 0.1–0.6)
MONOCYTES NFR BLD AUTO: 6 % — SIGNIFICANT CHANGE UP (ref 1.7–9.3)
MONOCYTES NFR BLD AUTO: 6.3 % — SIGNIFICANT CHANGE UP (ref 1.7–9.3)
NEUTROPHILS # BLD AUTO: 6.78 K/UL — HIGH (ref 1.4–6.5)
NEUTROPHILS # BLD AUTO: 8.43 K/UL — HIGH (ref 1.4–6.5)
NEUTROPHILS NFR BLD AUTO: 66.1 % — SIGNIFICANT CHANGE UP (ref 42.2–75.2)
NEUTROPHILS NFR BLD AUTO: 73.7 % — SIGNIFICANT CHANGE UP (ref 42.2–75.2)
NRBC # BLD: 0 /100 WBCS — SIGNIFICANT CHANGE UP (ref 0–0)
NRBC # BLD: 0 /100 WBCS — SIGNIFICANT CHANGE UP (ref 0–0)
NT-PROBNP SERPL-SCNC: 328 PG/ML — HIGH (ref 0–300)
PLATELET # BLD AUTO: 293 K/UL — SIGNIFICANT CHANGE UP (ref 130–400)
PLATELET # BLD AUTO: 358 K/UL — SIGNIFICANT CHANGE UP (ref 130–400)
PMV BLD: 10.2 FL — SIGNIFICANT CHANGE UP (ref 7.4–10.4)
PMV BLD: 10.6 FL — HIGH (ref 7.4–10.4)
POTASSIUM SERPL-MCNC: 3.6 MMOL/L — SIGNIFICANT CHANGE UP (ref 3.5–5)
POTASSIUM SERPL-MCNC: 4 MMOL/L — SIGNIFICANT CHANGE UP (ref 3.5–5)
POTASSIUM SERPL-MCNC: 4.1 MMOL/L — SIGNIFICANT CHANGE UP (ref 3.5–5)
POTASSIUM SERPL-SCNC: 3.6 MMOL/L — SIGNIFICANT CHANGE UP (ref 3.5–5)
POTASSIUM SERPL-SCNC: 4 MMOL/L — SIGNIFICANT CHANGE UP (ref 3.5–5)
POTASSIUM SERPL-SCNC: 4.1 MMOL/L — SIGNIFICANT CHANGE UP (ref 3.5–5)
PROT SERPL-MCNC: 6.5 G/DL — SIGNIFICANT CHANGE UP (ref 6–8)
PROT SERPL-MCNC: 6.6 G/DL — SIGNIFICANT CHANGE UP (ref 6–8)
PROT SERPL-MCNC: 6.8 G/DL — SIGNIFICANT CHANGE UP (ref 6–8)
PROTHROM AB SERPL-ACNC: 11 SEC — SIGNIFICANT CHANGE UP (ref 9.95–12.87)
RBC # BLD: 4.33 M/UL — SIGNIFICANT CHANGE UP (ref 4.2–5.4)
RBC # BLD: 4.81 M/UL — SIGNIFICANT CHANGE UP (ref 4.2–5.4)
RBC # FLD: 14.6 % — HIGH (ref 11.5–14.5)
RBC # FLD: 14.6 % — HIGH (ref 11.5–14.5)
SODIUM SERPL-SCNC: 137 MMOL/L — SIGNIFICANT CHANGE UP (ref 135–146)
SODIUM SERPL-SCNC: 138 MMOL/L — SIGNIFICANT CHANGE UP (ref 135–146)
SODIUM SERPL-SCNC: 140 MMOL/L — SIGNIFICANT CHANGE UP (ref 135–146)
TROPONIN T, HIGH SENSITIVITY RESULT: 21 NG/L — HIGH (ref 6–13)
TROPONIN T, HIGH SENSITIVITY RESULT: 22 NG/L — HIGH (ref 6–13)
URATE SERPL-MCNC: 8.5 MG/DL — HIGH (ref 2.5–7)
URATE SERPL-MCNC: 9.4 MG/DL — HIGH (ref 2.5–7)
URATE SERPL-MCNC: 9.6 MG/DL — HIGH (ref 2.5–7)
WBC # BLD: 10.24 K/UL — SIGNIFICANT CHANGE UP (ref 4.8–10.8)
WBC # BLD: 11.43 K/UL — HIGH (ref 4.8–10.8)
WBC # FLD AUTO: 10.24 K/UL — SIGNIFICANT CHANGE UP (ref 4.8–10.8)
WBC # FLD AUTO: 11.43 K/UL — HIGH (ref 4.8–10.8)

## 2024-11-19 PROCEDURE — 71045 X-RAY EXAM CHEST 1 VIEW: CPT | Mod: 26

## 2024-11-19 PROCEDURE — 99223 1ST HOSP IP/OBS HIGH 75: CPT

## 2024-11-19 PROCEDURE — 99213 OFFICE O/P EST LOW 20 MIN: CPT | Mod: 25

## 2024-11-19 PROCEDURE — 36415 COLL VENOUS BLD VENIPUNCTURE: CPT

## 2024-11-19 PROCEDURE — 84484 ASSAY OF TROPONIN QUANT: CPT

## 2024-11-19 PROCEDURE — 84439 ASSAY OF FREE THYROXINE: CPT

## 2024-11-19 PROCEDURE — 83615 LACTATE (LD) (LDH) ENZYME: CPT

## 2024-11-19 PROCEDURE — 85025 COMPLETE CBC W/AUTO DIFF WBC: CPT

## 2024-11-19 PROCEDURE — 93306 TTE W/DOPPLER COMPLETE: CPT | Mod: 26

## 2024-11-19 PROCEDURE — 99222 1ST HOSP IP/OBS MODERATE 55: CPT

## 2024-11-19 PROCEDURE — 93010 ELECTROCARDIOGRAM REPORT: CPT

## 2024-11-19 PROCEDURE — 83735 ASSAY OF MAGNESIUM: CPT

## 2024-11-19 PROCEDURE — 84550 ASSAY OF BLOOD/URIC ACID: CPT

## 2024-11-19 PROCEDURE — 80053 COMPREHEN METABOLIC PANEL: CPT

## 2024-11-19 PROCEDURE — 83605 ASSAY OF LACTIC ACID: CPT

## 2024-11-19 PROCEDURE — 84443 ASSAY THYROID STIM HORMONE: CPT

## 2024-11-19 RX ORDER — HYDRALAZINE HYDROCHLORIDE 10 MG/1
10 TABLET ORAL ONCE
Refills: 0 | Status: COMPLETED | OUTPATIENT
Start: 2024-11-19 | End: 2024-11-19

## 2024-11-19 RX ORDER — AMLODIPINE BESYLATE 10 MG/1
5 TABLET ORAL EVERY 24 HOURS
Refills: 0 | Status: DISCONTINUED | OUTPATIENT
Start: 2024-11-19 | End: 2024-11-20

## 2024-11-19 RX ORDER — LABETALOL 100 MG/1
20 TABLET, FILM COATED ORAL ONCE
Refills: 0 | Status: COMPLETED | OUTPATIENT
Start: 2024-11-19 | End: 2024-11-19

## 2024-11-19 RX ORDER — 0.9 % SODIUM CHLORIDE 0.9 %
1000 INTRAVENOUS SOLUTION INTRAVENOUS
Refills: 0 | Status: DISCONTINUED | OUTPATIENT
Start: 2024-11-19 | End: 2024-11-20

## 2024-11-19 RX ORDER — METOCLOPRAMIDE HYDROCHLORIDE 10 MG/1
10 TABLET ORAL ONCE
Refills: 0 | Status: COMPLETED | OUTPATIENT
Start: 2024-11-19 | End: 2024-11-19

## 2024-11-19 RX ORDER — INFLUENZA VIRUS VACCINE 15; 15; 15; 15 UG/.5ML; UG/.5ML; UG/.5ML; UG/.5ML
0.5 SUSPENSION INTRAMUSCULAR ONCE
Refills: 0 | Status: DISCONTINUED | OUTPATIENT
Start: 2024-11-19 | End: 2024-11-20

## 2024-11-19 RX ORDER — 0.9 % SODIUM CHLORIDE 0.9 %
1000 INTRAVENOUS SOLUTION INTRAVENOUS
Refills: 0 | Status: DISCONTINUED | OUTPATIENT
Start: 2024-11-19 | End: 2024-11-19

## 2024-11-19 RX ORDER — IBUPROFEN 200 MG
600 TABLET ORAL ONCE
Refills: 0 | Status: COMPLETED | OUTPATIENT
Start: 2024-11-19 | End: 2024-11-19

## 2024-11-19 RX ORDER — KETOROLAC TROMETHAMINE 30 MG/ML
30 INJECTION INTRAMUSCULAR; INTRAVENOUS ONCE
Refills: 0 | Status: DISCONTINUED | OUTPATIENT
Start: 2024-11-19 | End: 2024-11-19

## 2024-11-19 RX ORDER — FUROSEMIDE 40 MG/1
40 TABLET ORAL ONCE
Refills: 0 | Status: COMPLETED | OUTPATIENT
Start: 2024-11-19 | End: 2024-11-19

## 2024-11-19 RX ADMIN — HYDRALAZINE HYDROCHLORIDE 10 MILLIGRAM(S): 10 TABLET ORAL at 12:35

## 2024-11-19 RX ADMIN — AMLODIPINE BESYLATE 5 MILLIGRAM(S): 10 TABLET ORAL at 19:26

## 2024-11-19 RX ADMIN — HYDRALAZINE HYDROCHLORIDE 10 MILLIGRAM(S): 10 TABLET ORAL at 20:24

## 2024-11-19 RX ADMIN — Medication 600 MILLIGRAM(S): at 15:27

## 2024-11-19 RX ADMIN — KETOROLAC TROMETHAMINE 30 MILLIGRAM(S): 30 INJECTION INTRAMUSCULAR; INTRAVENOUS at 23:21

## 2024-11-19 RX ADMIN — Medication 50 GM/HR: at 12:40

## 2024-11-19 RX ADMIN — Medication 125 MILLILITER(S): at 13:37

## 2024-11-19 RX ADMIN — Medication 600 MILLIGRAM(S): at 17:00

## 2024-11-19 RX ADMIN — LABETALOL 20 MILLIGRAM(S): 100 TABLET, FILM COATED ORAL at 20:53

## 2024-11-19 RX ADMIN — METOCLOPRAMIDE HYDROCHLORIDE 10 MILLIGRAM(S): 10 TABLET ORAL at 17:42

## 2024-11-19 RX ADMIN — FUROSEMIDE 40 MILLIGRAM(S): 40 TABLET ORAL at 12:33

## 2024-11-19 RX ADMIN — Medication 300 GRAM(S): at 12:38

## 2024-11-19 NOTE — PROGRESS NOTE ADULT - SUBJECTIVE AND OBJECTIVE BOX
PGY1 note    Subjective:   Pt evaluated at bedside for magnesium check. Pt reports improvement of HA after IVP of reglan, rating 5/10. She denies changes in vision or right upper quadrant pain. Reports significant improvement in SOB. Denies CP, N/V, new onset LE swelling, calf tenderness.  Pain controlled.    Objective:   T(F): 98 (11-19 @ 13:24), Max: 98.3 (11-19 @ 11:53)  HR: 69 (11-19 @ 19:35)  BP: 154/67 (11-19 @ 19:33) (134/62 - 197/92)  RR: 24 (11-19 @ 13:24)  SpO2: 100% (11-19 @ 19:35)  Vital Signs Last 24 Hrs  BP: 154/67 (19 Nov 2024 19:33) (134/62 - 197/92)    11-19 @ 07:01  -  11-19 @ 19:40  --------------------------------------------------------  IN: 1400 mL / OUT: 6400 mL / NET: -5000 mL        Gen: NAD, AAOx3  CV: RRR, no M/R/G  Pulm: mild crackles noted in lower lung fields, clear to auscultation otherwise   Abd: soft, nontender, no rebound or guarding, no epigastric tenderness, liver nonpalpable +BS.   : Loving   Ext: +1 edema curt, SCDs in place  Neuro: 2+ BL upper extremity reflexes    Medications:  amLODIPine   Tablet 5 milliGRAM(s) Oral every 24 hours  influenza   Vaccine 0.5 milliLiter(s) IntraMuscular once  lactated ringers. 1000 milliLiter(s) IV Continuous <Continuous>  magnesium sulfate Infusion 2 Gm/Hr IV Continuous <Continuous>    No Known Allergies      Labs:                        12.5   11.43 )-----------( 358      ( 19 Nov 2024 17:40 )             37.2     11-19    137  |  101  |  12  ----------------------------<  86  4.0   |  26  |  0.8    Ca    9.3      19 Nov 2024 17:40  Mg     3.8     11-19    TPro  6.8  /  Alb  3.7  /  TBili  0.3  /  DBili  x   /  AST  45[H]  /  ALT  60[H]  /  AlkPhos  127[H]  11-19    Uric Acid: 9.6 mg/dL (11-19 @ 17:40)  Magnesium: 3.8 mg/dL (11-19 @ 17:40)  Uric Acid: 8.5 mg/dL (11-19 @ 12:20)

## 2024-11-19 NOTE — PROGRESS NOTE ADULT - ASSESSMENT
Assessment:  34y now P2 s/p rpt#2 LTCS now admitted for post partum preeclampsia w/ SF,  on magnesium for preeclampsia with/without severe features    Plan:  - s/p IVP Hydralazine 10mg and Lasix 40mg   - echo showed mild tricuspid regurgitation with normal EF and borderline pulmonary hypertension   - chest x-ray showed pulmonary edema   - strict BP monitoring (current /62)   -Continue magnesium until 11/20 12:38   -continue to monitor for signs of magnesium toxicity q2h  -Pain management prn  -Cont seizure precautions  - strict I'S/O'S   -f/u in 2 hours

## 2024-11-19 NOTE — CONSULT NOTE ADULT - ATTENDING COMMENTS
Patient seen and examined at bedside  35 YO F P2 s/p LT  on 24 presented to L&D with complaints of SOB since yesterday.   on presentation uncontrolled BP and pulmonary congestion likely related to pre eclampsia  respond quickly to lasix IV x 1 dose. currently feels better, no active sob,, denies chest pain or palpitations, no syncope.   echo reviewed normal EF no severe valve disease   initial EKG sinus bradycardia no block, currently HR improved in 70's  BP improved s/p IV Hydralazine   will suggest po CCB amlodipine can increase dose if needed   given initial bradycardia would avoid BB at this point.   patient clinically improved, EF normal , suggest BP control   other workup as per primary GYN team   thank you for the consult and if new or worsening cardiac symptoms please call cardio team back

## 2024-11-19 NOTE — OB PROVIDER H&P - ASSESSMENT
35yo  POD5 from rpt#2 LTCS c/b gHTN now readmitted with PP PEC w/ SF on magnesium for seizure prophylaxis    -chest xray showed bilateral pulmonary edema   - Lasix 40mg IVP given @1235 and IVP of Hydralazine 10mg @1236   - echo wnl   - cardiology consult placed to r/o cardiomyopathy   - continue magnesium until  @1238   - strict I's/O's   - monitor BP's (current /69)   - f/u CMP, coags, d-dimer, BNP and troponins

## 2024-11-19 NOTE — CONSULT NOTE ADULT - ASSESSMENT
**INCOMPLETE NOTE**     35 YO F P2 s/p LT  on 24 presented to L&D with complaints of SOB since yesterday.     IMPRESSION  #Suspected PPCM vs preeclampsia   - s/p hydralazine 10mg IV x 1 and lasix 40mg IV x 1  #Sinus bradycardia  - EKG: NSR. Sinus masha. No ST changes.     PLAN  - 2D echo  - pro-BNP         33 YO F P2 s/p LT  on 24 presented to L&D with complaints of SOB since yesterday.     TTE 24: Hyperdynamic LV. EF 70-75%. Mildly enlarged LA. Mild-mod TR. Borderline pHTN (PASP = 39mmHg).     IMPRESSION  #Postpartum preeclampsia; PPCM ruled out  - s/p hydralazine 10mg IV x 1 and lasix 40mg IV x 1  - proBNP: 328 (24)  #Sinus bradycardia; likely secondary to uncontrolled HTN  - EKG: NSR. Sinus masha. No ST changes.     PLAN  - Amlodipine 5mg PO daily. Increase as tolerated.   - Further preeclampsia management as per OBGYN team.    Cardiology team will sign off.          35 YO F P2 s/p LT  on 24 presented to L&D with complaints of SOB since yesterday.     TTE 24: Hyperdynamic LV. EF 70-75%.     IMPRESSION  #Postpartum preeclampsia; PPCM ruled out  - s/p hydralazine 10mg IV x 1 and lasix 40mg IV x 1  - proBNP: 328 (24)  #Sinus bradycardia; currently heart rate improved (no evidence of block)   - EKG: NSR. Sinus masha. No ST changes.     PLAN  - Amlodipine 5mg PO daily. Increase as tolerated.   - Further preeclampsia management as per OBGYN team.

## 2024-11-19 NOTE — OB RN TRIAGE NOTE - FALL HARM RISK - UNIVERSAL INTERVENTIONS
Bed in lowest position, wheels locked, appropriate side rails in place/Call bell, personal items and telephone in reach/Instruct patient to call for assistance before getting out of bed or chair/Non-slip footwear when patient is out of bed/[place] to call system/Physically safe environment - no spills, clutter or unnecessary equipment/Purposeful Proactive Rounding/Room/bathroom lighting operational, light cord in reach

## 2024-11-19 NOTE — OB RN TRIAGE NOTE - NS_GBS_INFANT_INVASIVE_OBGYN_ALL_OB_FT
N/A Scribe Attestation (For Scribes USE Only)... Attending Attestation (For Attendings USE Only).../Scribe Attestation (For Scribes USE Only)...

## 2024-11-19 NOTE — PROGRESS NOTE ADULT - SUBJECTIVE AND OBJECTIVE BOX
PGY1 Magnesium Note     Subjective:   Pt evaluated at bedside for magnesium check. Pt found sitting up comfortably in bed, complains of mild HA but does not desire medication. She denies vision changes, dizziness, SOB, chest pain, RUQ/epigastric pain, new or worsening LE swelling.     Objective:   Vital signs: T(F): 97 (11-19-24 @ 19:33), Max: 98.3 (11-19-24 @ 11:53)  HR: 80 (11-19-24 @ 22:20) (42 - 91)  BP: 146/68 (11-19-24 @ 22:20) (134/62 - 197/92)  RR: 16 (11-19-24 @ 19:33) (15 - 24)  SpO2: 97% (11-19-24 @ 22:15) (75% - 100%)    11-19-24 @ 07:01  -  11-19-24 @ 22:22  --------------------------------------------------------  IN: 1700 mL / OUT: 7075 mL / NET: -5375 mL        Gen: NAD, AAOx3  CV: S1S2, RRR, no M/R/G  Pulm: CTAB  Ext: no calf tenderness or edema, SCDs in place  Neuro: 2+ DTR bilaterally  Abd: soft, nontender, no rebound or guarding, no epigastric tenderness  Lochia: minimal   Incision: Clean, dry, intact. No drainage or surrounding erythema.     Medications:  amLODIPine   Tablet: 5 (11-19-24 @ 19:26)  furosemide   Injectable: 40 (11-19-24 @ 12:33)  hydrALAZINE Injectable: 10 (11-19-24 @ 12:35)  hydrALAZINE Injectable: 10 (11-19-24 @ 20:24)  ibuprofen  Tablet.: 600 (11-19-24 @ 15:27)  labetalol Injectable: 20 (11-19-24 @ 20:53)  lactated ringers.: 125 (11-19-24 @ 13:21)  magnesium sulfate  IVPB: 300 (11-19-24 @ 12:38)  magnesium sulfate Infusion: 50 (11-19-24 @ 12:24)  metoclopramide Injectable: 10 (11-19-24 @ 17:42)      Labs:   11/19 10.24>11.3/33.9<293, 140/4.1/106/20/11/0.8<73, AST/ALT 46/56, Uric Acid 8.5, , Troponin 22, , Coags 11/0.93/27.8, fibrinogen 513  @ 1740 11.43>12.5/37.2<358; 137/4.0/101/26/12/0.8<86; AST/ALT 45/60 Uric Acid 9.6  Troponin 21 Lactate 1.3 Mg 3.8    EKG: sinus masha with sinus arrythmia (46bpm), otherwise normal  TTE  1. Hyperdynamic global left ventricular systolic function. Left   ventricular ejection fraction, by visual estimation, is 70 to 75%. Normal   diastolic function. No regional wall motion abnormalities. 2. Normal right ventricular size and function.3. Mildly enlarged left atrium.4. Mild-moderate tricuspid regurgitation.  5. Borderline pulmonary hypertension (PASP = 39mmHg). 6. There is no evidence of pericardial effusion.  7. There are no prior echocardiograms available for comparison.  CXR: IMPRESSION: Bilateral opacification of the lung parenchyma, (right greater than left). Small bilateral pleural effusions.                        12.5   11.43 )-----------( 358      ( 19 Nov 2024 17:40 )             37.2   11-19    137  |  101  |  12  ----------------------------<  86  4.0   |  26  |  0.8    Ca    9.3      19 Nov 2024 17:40  Mg     3.8     11-19    TPro  6.8  /  Alb  3.7  /  TBili  0.3  /  DBili  x   /  AST  45[H]  /  ALT  60[H]  /  AlkPhos  127[H]  11-19      Urinalysis Basic - ( 19 Nov 2024 17:40 )    Color: x / Appearance: x / SG: x / pH: x  Gluc: 86 mg/dL / Ketone: x  / Bili: x / Urobili: x   Blood: x / Protein: x / Nitrite: x   Leuk Esterase: x / RBC: x / WBC x   Sq Epi: x / Non Sq Epi: x / Bacteria: x

## 2024-11-19 NOTE — PROGRESS NOTE ADULT - ASSESSMENT
Assessment:  34y now P2 s/p rpt#2 LTCS now admitted for post partum preeclampsia w/ SF,  on magnesium for seizure prophylaxis     Plan:  - s/p IVP Hydralazine 10mg and Lasix 40mg   - echo showed mild tricuspid regurgitation with normal EF and borderline pulmonary hypertension   - chest x-ray showed pulmonary edema   - strict BP monitoring (current /68)   - started on amlodipine 5mg   -Continue magnesium until 11/20 12:38   -continue to monitor for signs of magnesium toxicity q2h  -Pain management prn  -Cont seizure precautions  - strict I'S/O'S   -f/u in 2 hours

## 2024-11-19 NOTE — OB PROVIDER H&P - HISTORY OF PRESENT ILLNESS
34y now P2 s/p rpt#2 LTCS on 11/14/24 presents to L&D with complaints of SOB since yesterday. Pt reports that she used her daughters albuterol inhaler which did not improve her SOB. Pt denies any history of asthma, sick contacts, fevers, chills, HA, vision changes, CP, abdominal pain, or new onset extremity swelling. Patient was advised by PMD to come to L&D for blood pressure monitoring. During her labor course patient was diagnosed with gestational hypertension with normal BP PP. Patient and FOB positive for sickle cell trait.

## 2024-11-19 NOTE — CHART NOTE - NSCHARTNOTEFT_GEN_A_CORE
PGY1 Note    Pt assessed at bedside due to 2 sequential severe range BPs: 190/70 @2003 and 164/72 @2018. Pt endorses feeling well, mild HA but explains she has not been sleeping. Denies CP, SOB, dizziness, n/v, vision changes, RUQ, epigastric pain.   10mg IVP hydralazine pushed at @2025    -Plan to reassess BP in 20 min from IVP  -continue magnesium  -continue seizure precautions  -monitor labs, vitals PGY1 Note    Pt assessed at bedside due to 2 sequential severe range BPs: 190/70 @2003 and 164/72 @2018. Pt endorses feeling well, mild HA but explains she has not been sleeping. Denies CP, SOB, dizziness, n/v, vision changes, RUQ, epigastric pain.   10mg IVP hydralazine pushed at @2025    -Plan to reassess BP in 20 min from IVP  -continue magnesium  -continue seizure precautions  -monitor labs, vitals    Addendum:  Follow up blood pressure was 180/81 after IVP hydralazine. 20mg IVP labetalol was given.     -plan to reassess BP in 10 min from IVP  -continue magnesium  -continue seizure precautions  -monitor labs, vitals

## 2024-11-19 NOTE — OB PROVIDER H&P - NS_EDDCALCULATED_OBGYN_ALL_OB
Initial Anesthesia Post-op Note    Patient: Africa Hernandez  Procedure(s) Performed: BILATERAL TOTAL KNEE ARTHROPLASTY - BILATERAL  Anesthesia type: Spinal    Vitals Value Taken Time   Temp 36.9 12/4/2019  3:11 PM   Pulse 81 12/4/2019  3:10 PM   Resp 16 12/4/2019  3:11 PM   SpO2 99 % 12/4/2019  3:10 PM    59 12/4/2019  3:11 PM   Vitals shown include unvalidated device data.    Last 24 I/O:     Intake/Output Summary (Last 24 hours) at 12/4/2019 1511  Last data filed at 12/4/2019 1503  Gross per 24 hour   Intake 1600 ml   Output 225 ml   Net 1375 ml         Patient Location: PACU Phase 1  Post-op Vital Signs:stable  Level of Consciousness: responds to stimulation  Respiratory Status: spontaneous ventilation and face mask  Cardiovascular stable  Hydration: euvolemic  Pain Management: well controlled  Handoff: Handoff to receiving nurse was performed and questions were answered  Nausea: None  Airway Patency:patent  Post-op Assessment: no complications, patient tolerated procedure well with no complications and evidence of recall     First Trimester Sonogram

## 2024-11-19 NOTE — OB PROVIDER TRIAGE NOTE - NSHPLABSRESULTS_GEN_ALL_CORE
GCT: 103    Sonograms:  11/12/2024: 38w2d: vtx, post placenta, no previa, MVP 5.87 cm, BPP 8/8  10/22/2024: 35w2d: EFW 2669 gms (51%), vtx, post placenta, no previa, MVP 6.28 cm   9/24/2024: 31w2d: EFW 1706 gms (33%), vtx, post placenta, MVP 6.45 cm, BPP 8/8 7/16/2024: 21w2d:  gms, breech, post placenta, MVP 6.77 cm, CL 4.53 cm, no major fetal malformation noted

## 2024-11-19 NOTE — CONSULT NOTE ADULT - SUBJECTIVE AND OBJECTIVE BOX
Ms. Girma Macias, is a 34y now P2 s/p rpt#2 LTCS on 11/14/24 presents to L&D with complaints of SOB since yesterday. She denies any chest pain, visual changes, or abdominal pain.  She reports that she used her daughters albuterol inhaler which did not improve her SOB.     Pt denies any history of asthma, any sick contacts at home, fevers, chills, headache, or new onset extremity swelling.     Patient was advised by primary medical doctor to come to L&D for blood pressure monitoring. During her labor course patient was diagnosed with gestational hypertension with normal BP postpartum. Patient and FOB positive for sickle cell trait.    History of 2 prior C/S, morbid obesity    Medications:  -ibuprofen PRN    Allergies:  NKDA    SH:  No smoking or ETOH, works as a CNA.  Previous pregnancy was C/S for twins.    ROS;  Noncontributory    Physical Examination:    Temp 36.8 degress C/98.3 degrees F  HR46 bpm  RR 15  / 79 mmGH  O2 saturation 98 percent on room air  Cardiac-bradycardia, no murmur  Lungs:  bilateral diffuse rales  Abdomen:  soft, non tender  Extremities:  no clubbing, cyanosis, or edema

## 2024-11-19 NOTE — OB RN PATIENT PROFILE - POST PARTUM DEPRESSION SCREEN OB 4
Requested information relayed to son questions answered son voiced understanding    #[id number]    btrn   no

## 2024-11-19 NOTE — OB PROVIDER TRIAGE NOTE - NSOBPROVIDERNOTE_OBGYN_ALL_OB_FT
35yo now P2 s/p rpt#2 LTCS POD 5 complicated by gHTN now here with SOB and bradycardia   - cardiology consult placed r/o cardiomyopathy   - chest x ray ordered   - f/u troponin, BNP, d-dimer, CBC, CMP, and coags   - f/o PEC w/ SF

## 2024-11-19 NOTE — OB PROVIDER TRIAGE NOTE - NSHPPHYSICALEXAM_GEN_ALL_CORE
Vital Signs Last 24 Hrs  T(C): 36.8 (19 Nov 2024 11:53), Max: 36.8 (19 Nov 2024 11:53)  T(F): 98.3 (19 Nov 2024 11:53), Max: 98.3 (19 Nov 2024 11:53)  HR: 46 (19 Nov 2024 12:24) (42 - 59)  BP: 197/- (19 Nov 2024 12:24) (185/79 - 197/-)  RR: 15 (19 Nov 2024 11:53) (15 - 15)  SpO2: 98% (19 Nov 2024 12:20) (90% - 98%)    Gen: NAD, sitting comfortably  Abd: Gravid, soft, NT, palpable ctx  Resp: Crackles ascultated in bl upper lung fields

## 2024-11-19 NOTE — PROGRESS NOTE ADULT - ASSESSMENT
34y now P2 s/p rpt#2 LTCS now admitted for postpartum preeclampsia w/ SF, now on magnesium for seizure prophylaxis     Plan:  - s/p @1235 40mg IVP lasix @1236 10mg IVP hydralzine, amlodipine 5mg @1926, 10mg IVP hydral @2025, IVP 20mg labetalol @2053  - echo showed mild tricuspid regurgitation with normal EF and borderline pulmonary hypertension   - chest x-ray showed pulmonary edema   - strict BP monitoring (current /68)  - started on amlodipine 5mg   -Continue magnesium until 11/20 12:38   -continue to monitor for signs of magnesium toxicity q2h  -Pain management prn  -Cont seizure precautions  - strict I'S/O'S   -f/u in 2 hours

## 2024-11-19 NOTE — CONSULT NOTE ADULT - SUBJECTIVE AND OBJECTIVE BOX
Date of Admission: 24    HISTORY OF PRESENT ILLNESS:    33 YO F P2 s/p LT  on 24 presented to L&D with complaints of SOB since yesterday. Pt reports that she used her daughters albuterol inhaler which did not improve her SOB. Pt denies any history of asthma, sick contacts, fevers, chills, HA, vision changes, CP, abdominal pain, or new onset extremity swelling. Patient was advised by PMD to come to L&D for blood pressure monitoring. During her labor course patient was diagnosed with gestational hypertension with normal BP PP. Patient and FOB positive for sickle cell trait.      PAST MEDICAL & SURGICAL HISTORY  H/O sickle cell trait    H/O  section        FAMILY HISTORY:  No pertinent family history in first degree relatives      - None  - Mother:   - Father:   - Siblings:     SOCIAL HISTORY:   - Non-smoker  - Smoker  - Ex-smoker    REVIEW OF SYMPTOMS:  CONSTITUTIONAL: No fevers or chills.  EYES: No visual changes, eye pain, or discharge  ENT: No vertigo; No ear pain or change in hearing; No sore throat or difficulty swallowing  NECK: No pain or stiffness  RESPIRATORY: No cough, wheezing, or hemoptysis; No shortness of breath  CARDIOVASCULAR: No chest pain, chest pressure or palpitations  GASTROINTESTINAL: No abdominal or epigastric pain; No nausea, vomiting, or hematemesis; No diarrhea or constipation; No melena or hematochezia  GENITOURINARY: No dysuria, frequency or hematuria  MUSCULOSKELETAL: No joint pain, no muscle pain  NEUROLOGICAL: No numbness or weakness  SKIN: No itching or rashes    VITALS:  T(C): 36.8 (24 @ 11:53), Max: 36.8 (24 @ 11:53)  HR: 60 (24 @ 12:47) (42 - 60)  BP: 197/92 (24 @ 12:24) (185/79 - 197/92)  RR: 15 (24 @ 11:53) (15 - 15)  SpO2: 97% (24 @ 12:47) (75% - 100%)  Wt(kg): --  Daily     Daily     PHYSICAL EXAM:  GEN: Not in acute distress  HEENT: EOMI  LUNGS: Dec BS   CARDIOVASCULAR: RRR, S1/S2 present, no murmurs, rubs or gallops, no JVD  ABD: Soft, non-tender, non-distended  EXT: No HOME  SKIN: Warm  NEURO: AAOx3    LABS:	 	                        11.3   10.24 )-----------( 293      ( 2024 12:20 )             33.9                   Intake and Output:      CARDIAC MARKERS:      TELEMETRY EVENTS:    ECG:    RADIOLOGY:    OTHER:    Echocardiogram:    Catheterization:    Stress Test: 	    Home Medications:  acetaminophen 325 mg oral tablet: 3 tab(s) orally every 6 hours (2024 07:48)  ibuprofen 600 mg oral tablet: 1 tab(s) orally every 6 hours (2024 07:48)  lanolin topical ointment: 1 Apply topically to affected area every 6 hours As needed Sore Nipples (2024 07:48)  oxyCODONE 5 mg oral tablet: 1 tab(s) orally once As needed Moderate to Severe Pain (4-10) (2024 07:48)  simethicone 80 mg oral tablet, chewable: 1 tab(s) orally every 4 hours As needed Gas (2024 07:48)    MEDICATIONS  (STANDING):  magnesium sulfate Infusion 2 Gm/Hr (50 mL/Hr) IV Continuous <Continuous>    MEDICATIONS  (PRN):         Date of Admission: 24    HISTORY OF PRESENT ILLNESS:    35 YO F P2 s/p LT  on 24 presented to L&D with complaints of SOB since yesterday. Pt reports that she used her daughters albuterol inhaler which did not improve her SOB. Pt denies any history of asthma, sick contacts, fevers, chills, HA, vision changes, CP, abdominal pain, or new onset extremity swelling. Patient was advised by PMD to come to L&D for blood pressure monitoring. During her labor course patient was diagnosed with gestational hypertension with normal BP PP. Patient and FOB positive for sickle cell trait.      PAST MEDICAL & SURGICAL HISTORY  H/O sickle cell trait    H/O  section        FAMILY HISTORY:  No pertinent family history in first degree relatives      SOCIAL HISTORY:   - Non-smoker    REVIEW OF SYMPTOMS:  CONSTITUTIONAL: No fevers or chills.  EYES: No visual changes, eye pain, or discharge  ENT: No vertigo; No ear pain or change in hearing; No sore throat or difficulty swallowing  NECK: No pain or stiffness  RESPIRATORY: Shortness of breath  CARDIOVASCULAR: No chest pain, chest pressure or palpitations  GASTROINTESTINAL: No abdominal or epigastric pain; No nausea, vomiting, or hematemesis; No diarrhea or constipation; No melena or hematochezia  GENITOURINARY: No dysuria, frequency or hematuria  MUSCULOSKELETAL: No joint pain, no muscle pain  NEUROLOGICAL: No numbness or weakness  SKIN: No itching or rashes    VITALS:  T(C): 36.8 (24 @ 11:53), Max: 36.8 (24 @ 11:53)  HR: 60 (24 @ 12:47) (42 - 60)  BP: 197/92 (24 @ 12:24) (185/79 - 197/92)  RR: 15 (24 @ 11:53) (15 - 15)  SpO2: 97% (24 @ 12:47) (75% - 100%)  Wt(kg): --  Daily     Daily     PHYSICAL EXAM:  GEN: Not in acute distress  HEENT: EOMI  LUNGS: Dec BS   CARDIOVASCULAR: RRR, S1/S2 present  ABD: Soft, non-tender, non-distended  EXT: 1+ HOME  SKIN: Warm  NEURO: AAOx3    LABS:	 	                        11.3   10.24 )-----------( 293      ( 2024 12:20 )             33.9                   Intake and Output:      CARDIAC MARKERS:      TELEMETRY EVENTS:    ECG:    RADIOLOGY:    OTHER:    Echocardiogram:  < from: TTE Echo Complete w/o Contrast w/ Doppler (24 @ 12:39) >   1. Hyperdynamic global left ventricular systolic function. Left   ventricular ejection fraction, by visual estimation, is 70 to 75%. Normal   diastolic function. No regional wall motion abnormalities.   2. Normal right ventricular size and function.   3. Mildly enlarged left atrium.   4. Mild-moderate tricuspid regurgitation.   5. Borderline pulmonary hypertension (PASP = 39mmHg).   6. There is no evidence of pericardial effusion.   7. There are no prior echocardiograms available for comparison.        < end of copied text >    Catheterization:    Stress Test: 	    Home Medications:  acetaminophen 325 mg oral tablet: 3 tab(s) orally every 6 hours (2024 07:48)  ibuprofen 600 mg oral tablet: 1 tab(s) orally every 6 hours (2024 07:48)  lanolin topical ointment: 1 Apply topically to affected area every 6 hours As needed Sore Nipples (2024 07:48)  oxyCODONE 5 mg oral tablet: 1 tab(s) orally once As needed Moderate to Severe Pain (4-10) (2024 07:48)  simethicone 80 mg oral tablet, chewable: 1 tab(s) orally every 4 hours As needed Gas (2024 07:48)    MEDICATIONS  (STANDING):  magnesium sulfate Infusion 2 Gm/Hr (50 mL/Hr) IV Continuous <Continuous>    MEDICATIONS  (PRN):

## 2024-11-19 NOTE — PROGRESS NOTE ADULT - SUBJECTIVE AND OBJECTIVE BOX
PGY1 note    Subjective:   Pt evaluated at bedside for magnesium check. Pt reports having a mild HA that did not improve with Motrin. She denies changes in vision or right upper quadrant pain. Reports improvement in shortness of breathe. Denies CP, N/V, new onset LE swelling, calf tenderness.  Pain controlled.    Objective:   T(F): 98 (11-19 @ 13:24), Max: 98.3 (11-19 @ 11:53)  HR: 70 (11-19 @ 16:52)  BP: 134/62 (11-19 @ 16:48) (134/62 - 197/92)  RR: 24 (11-19 @ 13:24)  SpO2: 100% (11-19 @ 16:47)  Vital Signs Last 24 Hrs  BP: 134/62 (19 Nov 2024 16:48) (134/62 - 197/92)    11-19 @ 07:01  -  11-19 @ 16:53  --------------------------------------------------------  IN: 400 mL / OUT: 5300 mL / NET: -4900 mL        Gen: NAD, AAOx3  CV: RRR, no M/R/G  Pulm: CTAB, no R/R/W  Abd: soft, nontender, no rebound or guarding, no epigastric tenderness, liver nonpalpable +BS.   : Loving   Ext: +1 edema curt, SCDs in place  Neuro: 2+ BL upper extremity reflexes    Medications:  influenza   Vaccine 0.5 milliLiter(s) IntraMuscular once  lactated ringers. 1000 milliLiter(s) IV Continuous <Continuous>  magnesium sulfate Infusion 2 Gm/Hr IV Continuous <Continuous>    No Known Allergies      Labs:                        11.3   10.24 )-----------( 293      ( 19 Nov 2024 12:20 )             33.9     11-19    140  |  106  |  11  ----------------------------<  73  4.1   |  20  |  0.8    Ca    8.7      19 Nov 2024 12:20    TPro  6.5  /  Alb  3.4[L]  /  TBili  0.4  /  DBili  x   /  AST  46[H]  /  ALT  56[H]  /  AlkPhos  111  11-19    Uric Acid: 8.5 mg/dL (11-19 @ 12:20)

## 2024-11-19 NOTE — OB PROVIDER TRIAGE NOTE - ATTENDING COMMENTS
35 yo P2, now pod5 from repeat c/s with sob, maternal bradycardia and severe hypertension  admit to ob service  pih/bnp/troponins sent to lab  hydralizine x 10mg given with good result  lasix 40mg ivp given  mgso4 started for seizure prophylaxis  awaiting cardiology and mfm consult  strict is and os

## 2024-11-20 ENCOUNTER — NON-APPOINTMENT (OUTPATIENT)
Age: 34
End: 2024-11-20

## 2024-11-20 VITALS — SYSTOLIC BLOOD PRESSURE: 155 MMHG | DIASTOLIC BLOOD PRESSURE: 69 MMHG | HEART RATE: 81 BPM

## 2024-11-20 LAB
ALBUMIN SERPL ELPH-MCNC: 3.6 G/DL — SIGNIFICANT CHANGE UP (ref 3.5–5.2)
ALBUMIN SERPL ELPH-MCNC: 3.6 G/DL — SIGNIFICANT CHANGE UP (ref 3.5–5.2)
ALP SERPL-CCNC: 121 U/L — HIGH (ref 30–115)
ALP SERPL-CCNC: 121 U/L — HIGH (ref 30–115)
ALT FLD-CCNC: 57 U/L — HIGH (ref 0–41)
ALT FLD-CCNC: 57 U/L — HIGH (ref 0–41)
ANION GAP SERPL CALC-SCNC: 10 MMOL/L — SIGNIFICANT CHANGE UP (ref 7–14)
ANION GAP SERPL CALC-SCNC: 9 MMOL/L — SIGNIFICANT CHANGE UP (ref 7–14)
AST SERPL-CCNC: 36 U/L — SIGNIFICANT CHANGE UP (ref 0–41)
AST SERPL-CCNC: 38 U/L — SIGNIFICANT CHANGE UP (ref 0–41)
BASOPHILS # BLD AUTO: 0.02 K/UL — SIGNIFICANT CHANGE UP (ref 0–0.2)
BASOPHILS # BLD AUTO: 0.02 K/UL — SIGNIFICANT CHANGE UP (ref 0–0.2)
BASOPHILS # BLD AUTO: 0.03 K/UL — SIGNIFICANT CHANGE UP (ref 0–0.2)
BASOPHILS NFR BLD AUTO: 0.2 % — SIGNIFICANT CHANGE UP (ref 0–1)
BASOPHILS NFR BLD AUTO: 0.2 % — SIGNIFICANT CHANGE UP (ref 0–1)
BASOPHILS NFR BLD AUTO: 0.3 % — SIGNIFICANT CHANGE UP (ref 0–1)
BILIRUB SERPL-MCNC: 0.2 MG/DL — SIGNIFICANT CHANGE UP (ref 0.2–1.2)
BILIRUB SERPL-MCNC: 0.3 MG/DL — SIGNIFICANT CHANGE UP (ref 0.2–1.2)
BUN SERPL-MCNC: 10 MG/DL — SIGNIFICANT CHANGE UP (ref 10–20)
BUN SERPL-MCNC: 11 MG/DL — SIGNIFICANT CHANGE UP (ref 10–20)
CALCIUM SERPL-MCNC: 8.1 MG/DL — LOW (ref 8.4–10.4)
CALCIUM SERPL-MCNC: 8.2 MG/DL — LOW (ref 8.4–10.5)
CHLORIDE SERPL-SCNC: 102 MMOL/L — SIGNIFICANT CHANGE UP (ref 98–110)
CHLORIDE SERPL-SCNC: 103 MMOL/L — SIGNIFICANT CHANGE UP (ref 98–110)
CO2 SERPL-SCNC: 24 MMOL/L — SIGNIFICANT CHANGE UP (ref 17–32)
CO2 SERPL-SCNC: 27 MMOL/L — SIGNIFICANT CHANGE UP (ref 17–32)
CREAT SERPL-MCNC: 0.7 MG/DL — SIGNIFICANT CHANGE UP (ref 0.7–1.5)
CREAT SERPL-MCNC: 1 MG/DL — SIGNIFICANT CHANGE UP (ref 0.7–1.5)
EGFR: 116 ML/MIN/1.73M2 — SIGNIFICANT CHANGE UP
EGFR: 75 ML/MIN/1.73M2 — SIGNIFICANT CHANGE UP
EOSINOPHIL # BLD AUTO: 0.14 K/UL — SIGNIFICANT CHANGE UP (ref 0–0.7)
EOSINOPHIL # BLD AUTO: 0.15 K/UL — SIGNIFICANT CHANGE UP (ref 0–0.7)
EOSINOPHIL # BLD AUTO: 0.15 K/UL — SIGNIFICANT CHANGE UP (ref 0–0.7)
EOSINOPHIL NFR BLD AUTO: 1.4 % — SIGNIFICANT CHANGE UP (ref 0–8)
EOSINOPHIL NFR BLD AUTO: 1.5 % — SIGNIFICANT CHANGE UP (ref 0–8)
EOSINOPHIL NFR BLD AUTO: 1.5 % — SIGNIFICANT CHANGE UP (ref 0–8)
GLUCOSE SERPL-MCNC: 115 MG/DL — HIGH (ref 70–99)
GLUCOSE SERPL-MCNC: 92 MG/DL — SIGNIFICANT CHANGE UP (ref 70–99)
HCT VFR BLD CALC: 35.9 % — LOW (ref 37–47)
HCT VFR BLD CALC: 37.4 % — SIGNIFICANT CHANGE UP (ref 37–47)
HCT VFR BLD CALC: 38.5 % — SIGNIFICANT CHANGE UP (ref 37–47)
HGB BLD-MCNC: 12.1 G/DL — SIGNIFICANT CHANGE UP (ref 12–16)
HGB BLD-MCNC: 12.5 G/DL — SIGNIFICANT CHANGE UP (ref 12–16)
HGB BLD-MCNC: 12.8 G/DL — SIGNIFICANT CHANGE UP (ref 12–16)
IMM GRANULOCYTES NFR BLD AUTO: 0.6 % — HIGH (ref 0.1–0.3)
LDH SERPL L TO P-CCNC: 427 — HIGH (ref 50–242)
LDH SERPL L TO P-CCNC: 474 — HIGH (ref 50–242)
LYMPHOCYTES # BLD AUTO: 1.69 K/UL — SIGNIFICANT CHANGE UP (ref 1.2–3.4)
LYMPHOCYTES # BLD AUTO: 1.74 K/UL — SIGNIFICANT CHANGE UP (ref 1.2–3.4)
LYMPHOCYTES # BLD AUTO: 17.8 % — LOW (ref 20.5–51.1)
LYMPHOCYTES # BLD AUTO: 18 % — LOW (ref 20.5–51.1)
LYMPHOCYTES # BLD AUTO: 2.34 K/UL — SIGNIFICANT CHANGE UP (ref 1.2–3.4)
LYMPHOCYTES # BLD AUTO: 22.4 % — SIGNIFICANT CHANGE UP (ref 20.5–51.1)
MAGNESIUM SERPL-MCNC: 4.3 MG/DL — CRITICAL HIGH (ref 1.8–2.4)
MAGNESIUM SERPL-MCNC: 4.4 MG/DL — CRITICAL HIGH (ref 1.8–2.4)
MAGNESIUM SERPL-MCNC: 4.7 MG/DL — CRITICAL HIGH (ref 1.8–2.4)
MCHC RBC-ENTMCNC: 25.8 PG — LOW (ref 27–31)
MCHC RBC-ENTMCNC: 25.9 PG — LOW (ref 27–31)
MCHC RBC-ENTMCNC: 26 PG — LOW (ref 27–31)
MCHC RBC-ENTMCNC: 33.2 G/DL — SIGNIFICANT CHANGE UP (ref 32–37)
MCHC RBC-ENTMCNC: 33.4 G/DL — SIGNIFICANT CHANGE UP (ref 32–37)
MCHC RBC-ENTMCNC: 33.7 G/DL — SIGNIFICANT CHANGE UP (ref 32–37)
MCV RBC AUTO: 77.2 FL — LOW (ref 81–99)
MCV RBC AUTO: 77.5 FL — LOW (ref 81–99)
MCV RBC AUTO: 77.6 FL — LOW (ref 81–99)
MONOCYTES # BLD AUTO: 0.48 K/UL — SIGNIFICANT CHANGE UP (ref 0.1–0.6)
MONOCYTES # BLD AUTO: 0.57 K/UL — SIGNIFICANT CHANGE UP (ref 0.1–0.6)
MONOCYTES # BLD AUTO: 0.57 K/UL — SIGNIFICANT CHANGE UP (ref 0.1–0.6)
MONOCYTES NFR BLD AUTO: 5.1 % — SIGNIFICANT CHANGE UP (ref 1.7–9.3)
MONOCYTES NFR BLD AUTO: 5.4 % — SIGNIFICANT CHANGE UP (ref 1.7–9.3)
MONOCYTES NFR BLD AUTO: 5.8 % — SIGNIFICANT CHANGE UP (ref 1.7–9.3)
NEUTROPHILS # BLD AUTO: 7 K/UL — HIGH (ref 1.4–6.5)
NEUTROPHILS # BLD AUTO: 7.22 K/UL — HIGH (ref 1.4–6.5)
NEUTROPHILS # BLD AUTO: 7.32 K/UL — HIGH (ref 1.4–6.5)
NEUTROPHILS NFR BLD AUTO: 70 % — SIGNIFICANT CHANGE UP (ref 42.2–75.2)
NEUTROPHILS NFR BLD AUTO: 74.1 % — SIGNIFICANT CHANGE UP (ref 42.2–75.2)
NEUTROPHILS NFR BLD AUTO: 74.5 % — SIGNIFICANT CHANGE UP (ref 42.2–75.2)
NRBC # BLD: 0 /100 WBCS — SIGNIFICANT CHANGE UP (ref 0–0)
PLATELET # BLD AUTO: 353 K/UL — SIGNIFICANT CHANGE UP (ref 130–400)
PLATELET # BLD AUTO: 364 K/UL — SIGNIFICANT CHANGE UP (ref 130–400)
PLATELET # BLD AUTO: 370 K/UL — SIGNIFICANT CHANGE UP (ref 130–400)
PMV BLD: 10.4 FL — SIGNIFICANT CHANGE UP (ref 7.4–10.4)
PMV BLD: 10.5 FL — HIGH (ref 7.4–10.4)
PMV BLD: 10.8 FL — HIGH (ref 7.4–10.4)
POTASSIUM SERPL-MCNC: 3.5 MMOL/L — SIGNIFICANT CHANGE UP (ref 3.5–5)
POTASSIUM SERPL-MCNC: 4.1 MMOL/L — SIGNIFICANT CHANGE UP (ref 3.5–5)
POTASSIUM SERPL-SCNC: 3.5 MMOL/L — SIGNIFICANT CHANGE UP (ref 3.5–5)
POTASSIUM SERPL-SCNC: 4.1 MMOL/L — SIGNIFICANT CHANGE UP (ref 3.5–5)
PROT SERPL-MCNC: 6.8 G/DL — SIGNIFICANT CHANGE UP (ref 6–8)
PROT SERPL-MCNC: 6.8 G/DL — SIGNIFICANT CHANGE UP (ref 6–8)
RBC # BLD: 4.65 M/UL — SIGNIFICANT CHANGE UP (ref 4.2–5.4)
RBC # BLD: 4.82 M/UL — SIGNIFICANT CHANGE UP (ref 4.2–5.4)
RBC # BLD: 4.97 M/UL — SIGNIFICANT CHANGE UP (ref 4.2–5.4)
RBC # FLD: 14.8 % — HIGH (ref 11.5–14.5)
RBC # FLD: 14.8 % — HIGH (ref 11.5–14.5)
RBC # FLD: 15 % — HIGH (ref 11.5–14.5)
SODIUM SERPL-SCNC: 136 MMOL/L — SIGNIFICANT CHANGE UP (ref 135–146)
SODIUM SERPL-SCNC: 139 MMOL/L — SIGNIFICANT CHANGE UP (ref 135–146)
T4 FREE SERPL-MCNC: 1.3 NG/DL — SIGNIFICANT CHANGE UP (ref 0.9–1.8)
TSH SERPL-MCNC: 1.35 UIU/ML — SIGNIFICANT CHANGE UP (ref 0.27–4.2)
URATE SERPL-MCNC: 8.5 MG/DL — HIGH (ref 2.5–7)
URATE SERPL-MCNC: 9.3 MG/DL — HIGH (ref 2.5–7)
WBC # BLD: 10.46 K/UL — SIGNIFICANT CHANGE UP (ref 4.8–10.8)
WBC # BLD: 9.4 K/UL — SIGNIFICANT CHANGE UP (ref 4.8–10.8)
WBC # BLD: 9.76 K/UL — SIGNIFICANT CHANGE UP (ref 4.8–10.8)
WBC # FLD AUTO: 10.46 K/UL — SIGNIFICANT CHANGE UP (ref 4.8–10.8)
WBC # FLD AUTO: 9.4 K/UL — SIGNIFICANT CHANGE UP (ref 4.8–10.8)
WBC # FLD AUTO: 9.76 K/UL — SIGNIFICANT CHANGE UP (ref 4.8–10.8)

## 2024-11-20 PROCEDURE — 99233 SBSQ HOSP IP/OBS HIGH 50: CPT

## 2024-11-20 RX ORDER — AMLODIPINE BESYLATE 10 MG/1
1 TABLET ORAL
Qty: 30 | Refills: 0
Start: 2024-11-20 | End: 2024-12-19

## 2024-11-20 RX ORDER — METOCLOPRAMIDE HYDROCHLORIDE 10 MG/1
10 TABLET ORAL ONCE
Refills: 0 | Status: COMPLETED | OUTPATIENT
Start: 2024-11-20 | End: 2024-11-20

## 2024-11-20 RX ORDER — AMLODIPINE BESYLATE 10 MG/1
5 TABLET ORAL ONCE
Refills: 0 | Status: COMPLETED | OUTPATIENT
Start: 2024-11-20 | End: 2024-11-20

## 2024-11-20 RX ORDER — ACETAMINOPHEN 500MG 500 MG/1
650 TABLET, COATED ORAL ONCE
Refills: 0 | Status: COMPLETED | OUTPATIENT
Start: 2024-11-20 | End: 2024-11-20

## 2024-11-20 RX ADMIN — AMLODIPINE BESYLATE 5 MILLIGRAM(S): 10 TABLET ORAL at 06:52

## 2024-11-20 RX ADMIN — KETOROLAC TROMETHAMINE 30 MILLIGRAM(S): 30 INJECTION INTRAMUSCULAR; INTRAVENOUS at 00:00

## 2024-11-20 RX ADMIN — METOCLOPRAMIDE HYDROCHLORIDE 10 MILLIGRAM(S): 10 TABLET ORAL at 04:23

## 2024-11-20 RX ADMIN — ACETAMINOPHEN 500MG 650 MILLIGRAM(S): 500 TABLET, COATED ORAL at 06:52

## 2024-11-20 RX ADMIN — METOCLOPRAMIDE HYDROCHLORIDE 10 MILLIGRAM(S): 10 TABLET ORAL at 11:00

## 2024-11-20 NOTE — PROGRESS NOTE ADULT - ASSESSMENT
35 yo P2, s/p rpt #2 LTCS readmitted for postpartum preeclampsia with severe features    #Postpartum preeclampsia  -most likely diagnosis is postpartum pre-eclampsia with pulmonary edema and maternal bradycardia (unclear etiology)  -chest x-ray showed bilateral pulmonary edema and patient now with mild hypertension after 1 dose of IV Hydralazine 40 mg and 40 mg IV lasix  -Had good response with Lasix with significant urine output.    - echo wnl  - cardiology consult: start amlodipine, avoid BB, normal Echo and EKG w/o signs of heart block  - BP Meds; started on amlodipine 5mg yesterday --> extra dose 5mg this AM, increase to 10mg  - continue magnesium until 11/20 @1238 for prophylaxis against seizure  - strict I's/O's especially as at risk for worsening pulmonary edema from IV fluid replacement.  Pulmonary edema most likely related to postpartum preeclampsia.    - monitor BP's, IV Hydralazine as clinically indicated.  Avoid IV Labetalol as it could worsen maternal bradycardia.   - f/u PEL's @1100

## 2024-11-20 NOTE — DISCHARGE NOTE OB - MEDICATION SUMMARY - MEDICATIONS TO TAKE
I will START or STAY ON the medications listed below when I get home from the hospital:    acetaminophen 325 mg oral tablet  -- 3 tab(s) by mouth every 6 hours  -- Indication: For pain    Tylenol 325 mg oral tablet  -- 2 tab(s) by mouth every 6 hours  -- Indication: For pain    amLODIPine 10 mg oral tablet  -- 1 tab(s) by mouth every 24 hours  -- Indication: For high blood pressure    simethicone 80 mg oral tablet, chewable  -- 1 tab(s) by mouth every 4 hours As needed Gas  -- Indication: For gas pain

## 2024-11-20 NOTE — DISCHARGE NOTE OB - PATIENT PORTAL LINK FT
You can access the FollowMyHealth Patient Portal offered by [facility] by registering at the following website: [email or web address]. By joining [employer]’s FollowMyHealth portal, you will also be able to view your health information using other applications (apps) compatible with our system.

## 2024-11-20 NOTE — DISCHARGE NOTE OB - HOSPITAL COURSE
Patient presented to [facility] on 11/19 for shortness of breath, bradycardia, and severe range blood pressures. Patient recieved IV push of hydralazine and lasix for management of severe range pressures. Patient was started on amlodipine, and received 24 hours of magnesium. Cardiology consult ruled out postpartum cardiomyopathy.    Patient discharged home in stable condition with VNS for outpatient follow up. Discharged on amlodipine 10mg QD. Patient with BP cuff for home blood pressure monitoring. Pre-eclampsia precautions discussed. Patient stable for discharge with close outpatient follow up.

## 2024-11-20 NOTE — PROGRESS NOTE ADULT - SUBJECTIVE AND OBJECTIVE BOX
PGY1 Magnesium Note     Subjective:   Pt evaluated at bedside for magnesium check. Pt resting comfortably in bed. She denies headache, vision changes, dizziness, SOB, chest pain, RUQ/epigastric pain, new or worsening LE pain    Objective:   Vital signs: T(F): 97 (11-19-24 @ 19:33), Max: 97 (11-19-24 @ 19:33)  HR: 87 (11-20-24 @ 02:30) (52 - 93)  BP: 136/62 (11-20-24 @ 02:20) (134/56 - 180/81)  RR: 16 (11-20-24 @ 00:55) (16 - 18)  SpO2: 98% (11-20-24 @ 02:30) (86% - 100%)    11-19-24 @ 07:01  -  11-20-24 @ 02:33  --------------------------------------------------------  IN: 2100 mL / OUT: 7825 mL / NET: -5725 mL    UO (60cc/hr) (7971-3655) 5700cc (3632-4732) 1375cc (1268-4911) 450cc (4620-6597) 150cc    Gen: NAD, AAOx3  CV: S1S2, RRR, no M/R/G  Pulm: CTAB  Ext: no calf tenderness or edema, SCDs in place  Neuro: 2+ DTR bilaterally  Abd: soft, nontender, no rebound or guarding, no epigastric tenderness  Lochia: minimal   Incision: Clean, dry, intact. No drainage or surrounding erythema.       Medications:  amLODIPine   Tablet: 5 (11-19-24 @ 19:26)  furosemide   Injectable: 40 (11-19-24 @ 12:33)  hydrALAZINE Injectable: 10 (11-19-24 @ 12:35)  hydrALAZINE Injectable: 10 (11-19-24 @ 20:24)  ibuprofen  Tablet.: 600 (11-19-24 @ 15:27)  ketorolac   Injectable: 30 (11-19-24 @ 23:21)  labetalol Injectable: 20 (11-19-24 @ 20:53)  lactated ringers.: 125 (11-19-24 @ 13:21)  magnesium sulfate  IVPB: 300 (11-19-24 @ 12:38)  magnesium sulfate Infusion: 50 (11-19-24 @ 12:24)  metoclopramide Injectable: 10 (11-19-24 @ 17:42)      Labs:   11/19 10.24>11.3/33.9<293, 140/4.1/106/20/11/0.8<73, AST/ALT 46/56, Uric Acid 8.5, , Troponin 22, , Coags 11/0.93/27.8, fibrinogen 513  @ 1740 11.43>12.5/37.2<358; 137/4.0/101/26/12/0.8<86; AST/ALT 45/60 Uric Acid 9.6  Troponin 21 Lactate 1.3 Mg 3.8  @2321 10.46>12.1/35.9<364, 138/3.6/102/22/14/13/0.8<98, AST/ALT 45/58, Uric acid 9.4, , Mag 4.3    EKG: sinus masha with sinus arrythmia (46bpm), otherwise normal  TTE  1. Hyperdynamic global left ventricular systolic function. Left   ventricular ejection fraction, by visual estimation, is 70 to 75%. Normal   diastolic function. No regional wall motion abnormalities. 2. Normal right ventricular size and function.3. Mildly enlarged left atrium.4. Mild-moderate tricuspid regurgitation.  5. Borderline pulmonary hypertension (PASP = 39mmHg). 6. There is no evidence of pericardial effusion.  7. There are no prior echocardiograms available for comparison.  CXR: IMPRESSION: Bilateral opacification of the lung parenchyma, (right greater than left). Small bilateral pleural effusions.                        12.1   10.46 )-----------( 364      ( 19 Nov 2024 23:21 )             35.9   11-19    138  |  102  |  13  ----------------------------<  98  3.6   |  22  |  0.8    Ca    8.3[L]      19 Nov 2024 23:21  Mg     4.3     11-19    TPro  6.6  /  Alb  3.5  /  TBili  0.3  /  DBili  x   /  AST  45[H]  /  ALT  58[H]  /  AlkPhos  117[H]  11-19      Urinalysis Basic - ( 19 Nov 2024 23:21 )    Color: x / Appearance: x / SG: x / pH: x  Gluc: 98 mg/dL / Ketone: x  / Bili: x / Urobili: x   Blood: x / Protein: x / Nitrite: x   Leuk Esterase: x / RBC: x / WBC x   Sq Epi: x / Non Sq Epi: x / Bacteria: x

## 2024-11-20 NOTE — PROGRESS NOTE ADULT - ASSESSMENT
34y now P2 s/p rpt#2 LTCS now admitted for postpartum preeclampsia w/ SF, now on magnesium for seizure prophylaxis     Plan:  - s/p @1235 40mg IVP lasix @1236 10mg IVP hydralzine, amlodipine 5mg @1926, 10mg IVP hydral @2025, IVP 20mg labetalol @2053  - echo showed mild tricuspid regurgitation with normal EF and borderline pulmonary hypertension   - chest x-ray showed pulmonary edema   - strict BP monitoring  - started on amlodipine 5mg   - Continue magnesium until 11/20 12:38   - continue to monitor for signs of magnesium toxicity q2h  - Pain management prn  - Cont seizure precautions  - strict I'S/O'S   - f/u in 2 hours

## 2024-11-20 NOTE — PROGRESS NOTE ADULT - SUBJECTIVE AND OBJECTIVE BOX
PGY1 Magnesium Note     Subjective:   Pt evaluated at bedside for magnesium check. Pt resting comfortably in bed. Toradol has helped with HA, no longer in pain. She denies headache, vision changes, dizziness, SOB, chest pain, RUQ/epigastric pain, new or worsening LE pain.    Objective:   Vital signs: T(F): 97 (11-19-24 @ 19:33), Max: 98 (11-19-24 @ 13:24)  HR: 79 (11-20-24 @ 00:15) (45 - 91)  BP: 149/63 (11-20-24 @ 00:06) (134/62 - 197/92)  RR: 16 (11-19-24 @ 19:33) (16 - 24)  SpO2: 97% (11-20-24 @ 00:15) (75% - 100%)    11-19-24 @ 07:01  -  11-20-24 @ 00:21  --------------------------------------------------------  IN: 1900 mL / OUT: 7525 mL / NET: -5625 mL    UO (60cc/hr) (2178-6661) 5700cc (7745-2712) 1375cc (4204-5822) 450cc    Gen: NAD, AAOx3  CV: S1S2, RRR, no M/R/G  Pulm: CTAB  Ext: no calf tenderness or edema, SCDs in place  Neuro: 2+ DTR bilaterally  Abd: soft, nontender, no rebound or guarding, no epigastric tenderness  Lochia: minimal   Incision: Clean, dry, intact. No drainage or surrounding erythema.     Medications:  amLODIPine   Tablet: 5 (11-19-24 @ 19:26)  furosemide   Injectable: 40 (11-19-24 @ 12:33)  hydrALAZINE Injectable: 10 (11-19-24 @ 12:35)  hydrALAZINE Injectable: 10 (11-19-24 @ 20:24)  ibuprofen  Tablet.: 600 (11-19-24 @ 15:27)  ketorolac   Injectable: 30 (11-19-24 @ 23:21)  labetalol Injectable: 20 (11-19-24 @ 20:53)  lactated ringers.: 125 (11-19-24 @ 13:21)  magnesium sulfate  IVPB: 300 (11-19-24 @ 12:38)  magnesium sulfate Infusion: 50 (11-19-24 @ 12:24)  metoclopramide Injectable: 10 (11-19-24 @ 17:42)      Labs:   11/19 10.24>11.3/33.9<293, 140/4.1/106/20/11/0.8<73, AST/ALT 46/56, Uric Acid 8.5, , Troponin 22, , Coags 11/0.93/27.8, fibrinogen 513  @ 1740 11.43>12.5/37.2<358; 137/4.0/101/26/12/0.8<86; AST/ALT 45/60 Uric Acid 9.6  Troponin 21 Lactate 1.3 Mg 3.8    EKG: sinus masha with sinus arrythmia (46bpm), otherwise normal  TTE  1. Hyperdynamic global left ventricular systolic function. Left   ventricular ejection fraction, by visual estimation, is 70 to 75%. Normal   diastolic function. No regional wall motion abnormalities. 2. Normal right ventricular size and function.3. Mildly enlarged left atrium.4. Mild-moderate tricuspid regurgitation.  5. Borderline pulmonary hypertension (PASP = 39mmHg). 6. There is no evidence of pericardial effusion.  7. There are no prior echocardiograms available for comparison.  CXR: IMPRESSION: Bilateral opacification of the lung parenchyma, (right greater than left). Small bilateral pleural effusions.                        12.5   11.43 )-----------( 358      ( 19 Nov 2024 17:40 )             37.2   11-19    138  |  102  |  13  ----------------------------<  98  3.6   |  22  |  0.8    Ca    8.3[L]      19 Nov 2024 23:21  Mg     4.3     11-19    TPro  6.6  /  Alb  3.5  /  TBili  0.3  /  DBili  x   /  AST  45[H]  /  ALT  58[H]  /  AlkPhos  117[H]  11-19      Urinalysis Basic - ( 19 Nov 2024 23:21 )    Color: x / Appearance: x / SG: x / pH: x  Gluc: 98 mg/dL / Ketone: x  / Bili: x / Urobili: x   Blood: x / Protein: x / Nitrite: x   Leuk Esterase: x / RBC: x / WBC x   Sq Epi: x / Non Sq Epi: x / Bacteria: x

## 2024-11-20 NOTE — DISCHARGE NOTE OB - CARE PLAN
1 Principal Discharge DX:	Severe pre-eclampsia, delivered, with postpartum complication  Assessment and plan of treatment:	If you notice a headache that won't go away, changes in vision, chest pain, shortness of breath, abdominal pain, severe leg swelling or pain please call your provider or go to the hospital.  If your blood pressure is 160/110 or higher, call your doctor or go to the hospital.  Assessment and plan of treatment:	Take your blood pressure every morning and evening. Write your pressures down in a book. Bring this book to your follow up appointment in 6 weeks. During this time, continue taking your amlodipine 10mg daily.

## 2024-11-20 NOTE — DISCHARGE NOTE OB - PLAN OF CARE
Take your blood pressure every morning and evening. Write your pressures down in a book. Bring this book to your follow up appointment in 6 weeks. During this time, continue taking your amlodipine 10mg daily. If you notice a headache that won't go away, changes in vision, chest pain, shortness of breath, abdominal pain, severe leg swelling or pain please call your provider or go to the hospital.  If your blood pressure is 160/110 or higher, call your doctor or go to the hospital.

## 2024-11-20 NOTE — PROGRESS NOTE ADULT - ASSESSMENT
33 yo P2, s/p rpt #2 LTCS readmitted for postpartum preeclampsia with severe features    #Postpartum preeclampsia  -most likely diagnosis is postpartum pre-eclampsia with pulmonary edema and maternal bradycardia (unclear etiology)  -chest x-ray showed bilateral pulmonary edema and patient now with mild hypertension after 1 dose of IV Hydralazine 40 mg and 40 mg IV lasix  -Had good response with Lasix with significant urine output.    - echo wnl  - cardiology consult: start amlodipine, avoid BB, normal Echo and EKG w/o signs of heart block  - continue magnesium until 11/20 @1238 for prophylaxis against seizure  - strict I's/O's especially as at risk for worsening pulmonary edema from IV fluid replacement.  Pulmonary edema most likely related to postpartum preeclampsia.    - monitor BP's, IV Hydralazine as clinically indicated.  Avoid IV Labetalol as it could worsen maternal bradycardia.   - f/u labs     35 yo P2, s/p rpt #2 LTCS readmitted for postpartum preeclampsia with severe features    #Postpartum preeclampsia  -most likely diagnosis is postpartum pre-eclampsia with pulmonary edema and maternal bradycardia (unclear etiology)  -chest x-ray showed bilateral pulmonary edema and patient now with mild hypertension after 1 dose of IV Hydralazine 40 mg and 40 mg IV lasix  -Had good response with Lasix with significant urine output.    - echo wnl  - cardiology consult: start amlodipine, avoid BB, normal Echo and EKG w/o signs of heart block  - BP Meds; started on amlodipine 5mg yesterday --> extra dose 5mg this AM, increase to 10mg  - continue magnesium until 11/20 @1238 for prophylaxis against seizure  - strict I's/O's especially as at risk for worsening pulmonary edema from IV fluid replacement.  Pulmonary edema most likely related to postpartum preeclampsia.    - monitor BP's, IV Hydralazine as clinically indicated.  Avoid IV Labetalol as it could worsen maternal bradycardia.   - f/u labs     Ms. Macias is a 35 yo P2, s/p rpt #2 on 11/14/2024 LTCS readmitted for postpartum preeclampsia with severe features, HD#3    #Postpartum preeclampsia  -most likely diagnosis is postpartum pre-eclampsia with pulmonary edema and maternal bradycardia (unclear etiology)  -chest x-ray showed bilateral pulmonary edema and patient now with mild hypertension after 1 dose of IV Hydralazine 40 mg and 40 mg IV lasix  -Had good response with Lasix with significant urine output.    - echo wnl  - cardiology consult: start amlodipine, avoid BB, normal Echo and EKG w/o signs of heart block  - BP Meds; started on amlodipine 5mg yesterday --> extra dose 5mg this AM, increase to 10mg  - continue magnesium until 11/20 @1238 for prophylaxis against seizure  - strict I's/O's especially as at risk for worsening pulmonary edema from IV fluid replacement.  Pulmonary edema most likely related to postpartum preeclampsia.    - monitor BP's, IV Hydralazine as clinically indicated.  Avoid IV Labetalol as it could worsen maternal bradycardia.   - f/u labs     Ms. Macias is a 33 yo P2, s/p rpt #2 on 11/14/2024 LTCS readmitted for postpartum preeclampsia with severe features, HD#2    #Postpartum preeclampsia  -most likely diagnosis is postpartum pre-eclampsia with pulmonary edema and maternal bradycardia (unclear etiology)  -chest x-ray showed bilateral pulmonary edema and patient now with mild hypertension after 1 dose of IV Hydralazine 40 mg and 40 mg IV lasix  -Had good response with Lasix with significant urine output.    - echo wnl  - cardiology consult: start amlodipine, avoid BB, normal Echo and EKG w/o signs of heart block  - BP Meds; started on amlodipine 5mg yesterday --> extra dose 5mg this AM, increase to 10mg  - continue magnesium until 11/20 @1238 for prophylaxis against seizure  - strict I's/O's especially as at risk for worsening pulmonary edema from IV fluid replacement.  Pulmonary edema most likely related to postpartum preeclampsia.    - monitor BP's, IV Hydralazine as clinically indicated.  Avoid IV Labetalol as it could worsen maternal bradycardia.   - f/u labs

## 2024-11-20 NOTE — PROGRESS NOTE ADULT - ASSESSMENT
34y now P2 s/p rpt#2 LTCS now admitted for postpartum preeclampsia w/ SF, now on magnesium for seizure prophylaxis     Plan:  - s/p @1235 40mg IVP lasix @1236 10mg IVP hydralzine, amlodipine 5mg @1926, 10mg IVP hydral @2025, IVP 20mg labetalol @2053  - echo showed mild tricuspid regurgitation with normal EF and borderline pulmonary hypertension   - chest x-ray showed pulmonary edema   - strict BP monitoring (current /65)  - started on amlodipine 5mg   - Continue magnesium until 11/20 12:38   - continue to monitor for signs of magnesium toxicity q2h  - Pain management prn  - Cont seizure precautions  - strict I'S/O'S   - f/u in 2 hours

## 2024-11-20 NOTE — DISCHARGE NOTE OB - TEMPERATURE GREATER THAN 100.0  F ORALLY
Requested Prescriptions   Pending Prescriptions Disp Refills     methocarbamol (ROBAXIN) 500 MG tablet [Pharmacy Med Name: METHOCARBAMOL 500MG TABS]  Last Written Prescription Date:  7/30/2018  Last Fill Quantity: 90,  # refills: 1   Last office visit: 7/30/2018 with prescribing provider:     Future Office Visit:   90 tablet 1     Sig: TAKE TWO TABLETS BY MOUTH THREE TIMES A DAY AS NEEDED    There is no refill protocol information for this order         Statement Selected

## 2024-11-20 NOTE — PROGRESS NOTE ADULT - SUBJECTIVE AND OBJECTIVE BOX
PGY1 note    Subjective:   Pt evaluated at bedside for magnesium check. Pt reports mild HA, rating 6/10. She denies changes in vision or right upper quadrant pain. Denies CP, SOB, N/V, new onset LE swelling, calf tenderness.  Pain controlled.    Objective:   HR: 83 (11-20 @ 10:39)  BP: 152/67 (11-20 @ 10:36) (75/45 - 166/73)  RR: 16 (11-20 @ 06:55)  SpO2: 98% (11-20 @ 10:44)  Vital Signs Last 24 Hrs  BP: 152/67 (20 Nov 2024 10:36) (75/45 - 197/92)    11-19 @ 07:01  -  11-20 @ 07:00  --------------------------------------------------------  IN: 2600 mL / OUT: 8640 mL / NET: -6040 mL      Gen: NAD, AAOx3  CV: RRR, no M/R/G  Pulm: CTAB, no R/R/W  Abd: soft, nontender, no rebound or guarding, no epigastric tenderness, liver nonpalpable +BS.   : Loving   Ext: +1 edema curt, SCDs in place  Neuro: 2+ BL upper extremity reflexes    Medications:  amLODIPine   Tablet 5 milliGRAM(s) Oral every 24 hours  influenza   Vaccine 0.5 milliLiter(s) IntraMuscular once  lactated ringers. 1000 milliLiter(s) IV Continuous <Continuous>  magnesium sulfate Infusion 2 Gm/Hr IV Continuous <Continuous>  metoclopramide Injectable 10 milliGRAM(s) IV Push once    No Known Allergies      Labs:                        12.5   9.76  )-----------( 353      ( 20 Nov 2024 05:06 )             37.4     11-20    139  |  103  |  11  ----------------------------<  115[H]  3.5   |  27  |  1.0    Ca    8.1[L]      20 Nov 2024 05:06  Mg     4.7     11-20    TPro  6.8  /  Alb  3.6  /  TBili  0.2  /  DBili  x   /  AST  36  /  ALT  57[H]  /  AlkPhos  121[H]  11-20    Uric Acid: 9.3 mg/dL (11-20 @ 05:06)  Magnesium: 4.7 mg/dL (11-20 @ 05:06)  Uric Acid: 9.4 mg/dL (11-19 @ 23:21)  Magnesium: 4.3 mg/dL (11-19 @ 23:21)  Uric Acid: 9.6 mg/dL (11-19 @ 17:40)  Magnesium: 3.8 mg/dL (11-19 @ 17:40)  Uric Acid: 8.5 mg/dL (11-19 @ 12:20)

## 2024-11-20 NOTE — PROGRESS NOTE ADULT - SUBJECTIVE AND OBJECTIVE BOX
JOSE DANIEL Progress note:    HOD#2  Admitted for: postpartum preeclampsia with severe features    Ms. Girma Macias, is a 34y now P2 s/p rpt#2 LTCS on 11/14/24 presents to L&D with complaints of SOB.    Interval history: Pt seen at bedside, doing well this morning. Overnight required additional IVP of hydralazine and labetalol for BP control. HR was improved, allowing for labetalol. She was started on amlodipine 5mg yesterday evening. She reports feeling better, SOB improved. Denies CP, RUQ pain, HA, dizziness.     ROS: neg    Allergies:  NKDA    Vital Signs Last 24 Hrs  T(C): 36.1 (19 Nov 2024 19:33), Max: 36.8 (19 Nov 2024 11:53)  T(F): 97 (19 Nov 2024 19:33), Max: 98.3 (19 Nov 2024 11:53)  HR: 57 (20 Nov 2024 06:15) (42 - 102)  BP: 145/65 (20 Nov 2024 06:06) (75/45 - 197/92)  BP(mean): --  RR: 18 (20 Nov 2024 05:10) (15 - 24)  SpO2: 93% (20 Nov 2024 06:15) (75% - 100%)    MEDICATIONS  (STANDING):  amLODIPine   Tablet 5 milliGRAM(s) Oral every 24 hours  influenza   Vaccine 0.5 milliLiter(s) IntraMuscular once  lactated ringers. 1000 milliLiter(s) (25 mL/Hr) IV Continuous <Continuous>  magnesium sulfate Infusion 2 Gm/Hr (50 mL/Hr) IV Continuous <Continuous>       JOSE DANIEL Progress note:    HOD#2  Admitted for: postpartum preeclampsia with severe features    Ms. Girma Macias, is a 34y now P2 s/p rpt#2 LTCS on 11/14/24 presents to L&D with complaints of SOB.    Interval history: Pt seen at bedside, doing well this morning. Overnight required additional IVP of hydralazine and labetalol for BP control. HR was improved, allowing for labetalol. She was started on amlodipine 5mg yesterday evening. She reports she still has a mild headache, not improved with toradol or reglan.  Her SOB improved. Denies CP, RUQ pain, dizziness.     Strongly desires to leave today after the magnesium therapy finishes    ROS: neg    Allergies:  NKDA    Vital Signs Last 24 Hrs  T(C): 36.1 (19 Nov 2024 19:33), Max: 36.8 (19 Nov 2024 11:53)  T(F): 97 (19 Nov 2024 19:33), Max: 98.3 (19 Nov 2024 11:53)  HR: 57 (20 Nov 2024 06:15) (42 - 102)  BP: 145/65 (20 Nov 2024 06:06) (75/45 - 197/92)  BP(mean): --  RR: 18 (20 Nov 2024 05:10) (15 - 24)  SpO2: 93% (20 Nov 2024 06:15) (75% - 100%)    PE:   General: AOAx3, NAAD  Heart: RRR  Lungs: CTAB  Abdomen: soft, NT  Neuro: +2 DTR BL UE  Extremities; no edema/swelling  BL, no calf tenderness    MEDICATIONS  (STANDING):  amLODIPine   Tablet 5 milliGRAM(s) Oral every 24 hours  influenza   Vaccine 0.5 milliLiter(s) IntraMuscular once  lactated ringers. 1000 milliLiter(s) (25 mL/Hr) IV Continuous <Continuous>  magnesium sulfate Infusion 2 Gm/Hr (50 mL/Hr) IV Continuous <Continuous>       JOSE DANIEL Progress note:    HOD#2  Admitted for: postpartum preeclampsia with severe features    Ms. Girma Macias, is a 34y now P2 s/p rpt#2 LTCS on 11/14/24 presents to L&D with complaints of SOB.    Interval history: Pt seen at bedside, doing well this morning. Overnight required additional IVP of hydralazine and labetalol for BP control. HR was improved, allowing for labetalol. She was started on amlodipine 5mg yesterday evening. She reports she still has a mild headache, not improved with toradol.  Regaln help somewhat.  Her SOB improved. Denies CP, RUQ pain, dizziness.     She desires to leave today.    ROS: neg    Allergies:  NKDA    Vital Signs Last 24 Hrs  T(C): 36.1 (19 Nov 2024 19:33), Max: 36.8 (19 Nov 2024 11:53)  T(F): 97 (19 Nov 2024 19:33), Max: 98.3 (19 Nov 2024 11:53)  HR: 57 (20 Nov 2024 06:15) (42 - 102)  BP: 145/65 (20 Nov 2024 06:06) (75/45 - 197/92)  BP(mean): --  RR: 18 (20 Nov 2024 05:10) (15 - 24)  SpO2: 93% (20 Nov 2024 06:15) (75% - 100%)    PE:   General: AOAx3, NAAD  Heart: RRR  Lungs: CTAB  Abdomen: soft, NT  Neuro: +2 DTR BL UE  Extremities; no edema/swelling  BL, no calf tenderness    MEDICATIONS  (STANDING):  amLODIPine   Tablet 5 milliGRAM(s) Oral every 24 hours  influenza   Vaccine 0.5 milliLiter(s) IntraMuscular once  lactated ringers. 1000 milliLiter(s) (25 mL/Hr) IV Continuous <Continuous>  magnesium sulfate Infusion 2 Gm/Hr (50 mL/Hr) IV Continuous <Continuous>                            12.8   9.40  )-----------( 370      ( 20 Nov 2024 11:20 )             38.5                         12.5   9.76  )-----------( 353      ( 20 Nov 2024 05:06 )             37.4                         12.1   10.46 )-----------( 364      ( 19 Nov 2024 23:21 )             35.9                         12.5   11.43 )-----------( 358      ( 19 Nov 2024 17:40 )             37.2                         11.3   10.24 )-----------( 293      ( 19 Nov 2024 12:20 )             33.9       11-20    136  |  102  |  10  ----------------------------<  92  4.1   |  24  |  0.7  11-20    139  |  103  |  11  ----------------------------<  115  3.5   |  27  |  1.0  11-19    138  |  102  |  13  ----------------------------<  98  3.6   |  22  |  0.8  11-19    137  |  101  |  12  ----------------------------<  86  4.0   |  26  |  0.8  11-19    140  |  106  |  11  ----------------------------<  73  4.1   |  20  |  0.8    Ca    8.2      20 Nov 2024 11:20   Mg    4.4  20 Nov 2024 11:20  Phos    x    20 Nov 2024 11:20  Ca    8.1      20 Nov 2024 05:06   Mg    4.7  20 Nov 2024 05:06  Phos    x    20 Nov 2024 05:06  Ca    8.3      19 Nov 2024 23:21   Mg    4.3  19 Nov 2024 23:21  Phos    x    19 Nov 2024 23:21  Ca    9.3      19 Nov 2024 17:40   Mg    3.8  19 Nov 2024 17:40  Phos    x    19 Nov 2024 17:40  Ca    8.7      19 Nov 2024 12:20   Mg    x   19 Nov 2024 12:20  Phos    x    19 Nov 2024 12:20    TPro  6.8  /  Alb  3.6  /  TBili  0.3  /  DBili  x   /  AST  38  /  ALT  57  /  AlkPhos  121  11-20-24      Lactate dehydrogenase  474    11-20-24  Uric acid    8.5   11-20-24  TPro  6.8  /  Alb  3.6  /  TBili  0.2  /  DBili  x   /  AST  36  /  ALT  57  /  AlkPhos  121  11-20-24      Lactate dehydrogenase  427    11-20-24  Uric acid    9.3   11-20-24  TPro  6.6  /  Alb  3.5  /  TBili  0.3  /  DBili  x   /  AST  45  /  ALT  58  /  AlkPhos  117  11-19-24      Lactate dehydrogenase  449    11-19-24  Uric acid    9.4   11-19-24  TPro  6.8  /  Alb  3.7  /  TBili  0.3  /  DBili  x   /  AST  45  /  ALT  60  /  AlkPhos  127  11-19-24      Lactate dehydrogenase  471    11-19-24  Uric acid    9.6   11-19-24  TPro  6.5  /  Alb  3.4  /  TBili  0.4  /  DBili  x   /  AST  46  /  ALT  56  /  AlkPhos  111  11-19-24      Lactate dehydrogenase  424    11-19-24  Uric acid    8.5   11-19-24      PT --  PTT   --  INR   --  Fibrinogen 513  11-19-24 @ 12:23  PT 11.00  PTT   27.8  INR   0.93  Fibrinogen --  11-19-24 @ 12:20    Pro-Brain Natriuretic Peptide (11.19.24 @ 12:20)  Pro-Brain Natriuretic Peptide: 328 pg/mL      Urinalysis Basic - ( 20 Nov 2024 11:20 )    Color: x / Appearance: x / SG: x / pH: x  Gluc: 92 mg/dL / Ketone: x  / Bili: x / Urobili: x   Blood: x / Protein: x / Nitrite: x   Leuk Esterase: x / RBC: x / WBC x   Sq Epi: x / Non Sq Epi: x / Bacteria: x      < from: Xray Chest 1 View-PORTABLE IMMEDIATE (Xray Chest 1 View-PORTABLE IMMEDIATE .) (11.19.24 @ 12:36) >      IMPRESSION:    Bilateral opacification of the lung parenchyma, (right greater than   left). Small bilateral pleural effusions.    --- End of Report ---    < from: 12 Lead ECG (11.19.24 @ 12:10) >      Ventricular Rate 46 BPM    Atrial Rate 46 BPM    P-R Interval 162 ms    QRS Duration 78 ms    Q-T Interval 434 ms    QTC Calculation(Bazett) 379 ms    P Axis 25 degrees    R Axis 14 degrees    T Axis 30 degrees    Diagnosis Line Sinus bradycardia with sinus arrhythmia  Otherwise normal ECG    Confirmed by Soy Jesus ([id number]) on 11/19/2024 6:32:00 PM    < end of copied text >        < from: TTE Echo Complete w/o Contrast w/ Doppler (11.19.24 @ 12:39) >      Summary:   1. Hyperdynamic global left ventricular systolic function. Left   ventricular ejection fraction, by visual estimation, is 70 to 75%. Normal   diastolic function. No regional wall motion abnormalities.   2. Normal right ventricular size and function.   3. Mildly enlarged left atrium.   4. Mild-moderate tricuspid regurgitation.   5. Borderline pulmonary hypertension (PASP = 39mmHg).   6. There is no evidence of pericardial effusion.   7. There are no prior echocardiograms available for comparison.    < end of copied text >

## 2024-11-20 NOTE — DISCHARGE NOTE OB - NS MD DC FALL RISK RISK
I gave her doxycycline about a month ago for this.  How bad is it now?  Has she tried topical antibiotic ointment and soaking the finger in the warm water?  If that has failed I will be sending another course of antibiotic.  Which pharmacy?   For information on Fall & Injury Prevention, visit: [email or web address] OR  [email or web address] OR  https://www.cdc.gov/steadi/patient.html

## 2024-11-20 NOTE — DISCHARGE NOTE OB - MEDICATION SUMMARY - MEDICATIONS TO STOP TAKING
I will STOP taking the medications listed below when I get home from the hospital:    ibuprofen 600 mg oral tablet  -- 1 tab(s) by mouth every 6 hours    Tylenol 325 mg oral tablet  -- 2 tab(s) by mouth every 6 hours

## 2024-11-20 NOTE — PROGRESS NOTE ADULT - ATTENDING COMMENTS
[facility] Staff    I saw and evaluated Ms. PACO MACIAS  with Dr Hanson  Ms. PACO MACIAS reports a headache.  She has no spots in front of her eyes and no RUQ pain.  Her vaginal bleeding is minimal.  Her shortness of breath is improved.    General:  Ms. PACO MACIAS is sitting in a chair.  She appears comfortable.    Vital Signs Last 24 Hrs  T(C): 36.1 (19 Nov 2024 19:33), Max: 36.7 (19 Nov 2024 13:24)  T(F): 97 (19 Nov 2024 19:33), Max: 98 (19 Nov 2024 13:24)  HR: 80 (20 Nov 2024 12:48) (45 - 102)  BP: 146/86 (20 Nov 2024 12:48) (75/45 - 180/81)  BP(mean): --  RR: 16 (20 Nov 2024 06:55) (16 - 24)  SpO2: 98% (20 Nov 2024 12:29) (82% - 100%)    I&O's Detail    19 Nov 2024 07:01  -  20 Nov 2024 07:00  --------------------------------------------------------  IN:    Lactated Ringers: 175 mL    Lactated Ringers: 300 mL    Magnesium Sulfate: 1325 mL    Oral Fluid: 800 mL  Total IN: 2600 mL    OUT:    Indwelling Catheter - Urethral (mL): 8640 mL  Total OUT: 8640 mL    Total NET: -6040 mL      20 Nov 2024 07:01  -  20 Nov 2024 13:11  --------------------------------------------------------  IN:    Lactated Ringers: 100 mL    Magnesium Sulfate: 300 mL  Total IN: 400 mL    OUT:    Indwelling Catheter - Urethral (mL): 1050 mL  Total OUT: 1050 mL    Total NET: -650 mL    Cardiovascular:  Normal S1 S2.  II/VI? DAVID  Pulmonary:  Clear to auscultation bilaterally.  No wheezing.  Abdomen:  Soft, nontender, nondistended, no rebound, no guarding.  Extremities:  Nontender calves.  Neurology:  Deep tendon reflexes 2+ bilaterally.    Ms. Macias is a 35 yo P2, s/p rpt #2 on 11/14/2024 LTCS readmitted with shortness of breath.    #Shortness of breath, likely due to preeclampsia with severe features.  - On presentation her blood pressures were elevated, requiring IV antihypertensive medication.  She also required IV antihypertensive medication        overnight.  She is on magnesium sulfate for seizure prophylaxis.  - She was started on Amlodipine 5 mg yesterday, and received a second dose this morning.  - Given the blood pressures I agree with Amlodipine 10 mg PO daily.  - On Chest x-ray Small bilateral pleural effusions were noted.  She received IV lasix yesterday and her urine output is as above.   - Peripartum cardiomyopathy is less likely as her BNP was 328 and the ejection fraction was 75% on echocardiogram.  - Bradycardia was noted yesterday, this seems so have resolved today.  EKG revealed sinus bradycardia. We will check TFTs.  I do not recommend labetalol (or other beta blocker) given the bradycardia.   - Appreciate Cardiology input.    She should be seen by Cardiology as an outpatient as preeclampsia is a risk factor for heart disease in the future.  In a future pregnancy I recommend Aspirin 81 mg PO daily starting at 12 weeks gestation unless there are reasons not to start the Aspirin.    Ms. Macias desires discharge today.  I do not recommend discharge today. I discussed this with Ms Macias and she understands that. If she does go home, she should have close monitoring of her blood pressures and monitor for signs and symptoms of preeclampsia.  She should follow up in our office in around two days with the blood pressure log and folow up with her primary Obstetrician as well.    Ricky Whitley MD [facility] Staff    I saw and evaluated Ms. PACO MACIAS  with Dr Hanson  Ms. PACO MACIAS reports a headache.  She has no spots in front of her eyes and no RUQ pain.  Her vaginal bleeding is minimal.  Her shortness of breath is improved.    General:  Ms. PACO MACIAS is sitting in a chair.  She appears comfortable.    Vital Signs Last 24 Hrs  T(C): 36.1 (19 Nov 2024 19:33), Max: 36.7 (19 Nov 2024 13:24)  T(F): 97 (19 Nov 2024 19:33), Max: 98 (19 Nov 2024 13:24)  HR: 80 (20 Nov 2024 12:48) (45 - 102)  BP: 146/86 (20 Nov 2024 12:48) (75/45 - 180/81)  BP(mean): --  RR: 16 (20 Nov 2024 06:55) (16 - 24)  SpO2: 98% (20 Nov 2024 12:29) (82% - 100%)    I&O's Detail    19 Nov 2024 07:01  -  20 Nov 2024 07:00  --------------------------------------------------------  IN:    Lactated Ringers: 175 mL    Lactated Ringers: 300 mL    Magnesium Sulfate: 1325 mL    Oral Fluid: 800 mL  Total IN: 2600 mL    OUT:    Indwelling Catheter - Urethral (mL): 8640 mL  Total OUT: 8640 mL    Total NET: -6040 mL      20 Nov 2024 07:01  -  20 Nov 2024 13:11  --------------------------------------------------------  IN:    Lactated Ringers: 100 mL    Magnesium Sulfate: 300 mL  Total IN: 400 mL    OUT:    Indwelling Catheter - Urethral (mL): 1050 mL  Total OUT: 1050 mL    Total NET: -650 mL    Cardiovascular:  Normal S1 S2.  II/VI? DAVID  Pulmonary:  Clear to auscultation bilaterally.  No wheezing.  Abdomen:  Soft, nontender, nondistended, no rebound, no guarding.  Extremities:  Nontender calves.  Neurology:  Deep tendon reflexes 2+ bilaterally.    Ms. Macias is a 33 yo P2, s/p rpt #2 on 11/14/2024 LTCS readmitted with shortness of breath HD#2.    #Shortness of breath, likely due to preeclampsia with severe features.  - On presentation her blood pressures were elevated, requiring IV antihypertensive medication.  She also required IV antihypertensive medication        overnight.  She is on magnesium sulfate for seizure prophylaxis.  - She was started on Amlodipine 5 mg yesterday, and received a second dose this morning.  - Given the blood pressures I agree with Amlodipine 10 mg PO daily.  - On Chest x-ray Small bilateral pleural effusions were noted.  She received IV lasix yesterday and her urine output is as above.   - Peripartum cardiomyopathy is less likely as her BNP was 328 and the ejection fraction was 75% on echocardiogram.  - Bradycardia was noted yesterday, this seems so have resolved today.  EKG revealed sinus bradycardia. We will check TFTs.  I do not recommend labetalol (or other beta blocker) given the bradycardia.   - Appreciate Cardiology input.    She should be seen by Cardiology as an outpatient as preeclampsia is a risk factor for heart disease in the future.  In a future pregnancy I recommend Aspirin 81 mg PO daily starting at 12 weeks gestation unless there are reasons not to start the Aspirin.    Ms. Macias desires discharge today.  I do not recommend discharge today. I discussed this with Ms Macias and she understands that. If she does go home, she should take Amlodipine 10 mg and have close monitoring of her blood pressures and monitor for signs and symptoms of preeclampsia.  She should follow up in our office in around two days with the blood pressure log and follow up with her primary Obstetrician as well.    Ricky Whitley MD

## 2024-11-20 NOTE — DISCHARGE NOTE OB - CARE PROVIDER_API CALL
Su Wagner  Obstetrics and Gynecology  [address] [place] [id number]  Phone: [phone]  Fax: [phone]  Follow Up Time:

## 2024-11-20 NOTE — DISCHARGE NOTE OB - FINANCIAL ASSISTANCE
[facility] provides services at a reduced cost to those who are determined to be eligible through [facility]’s financial assistance program. Information regarding [facility]’s financial assistance program can be found by going to [email or web address] or by calling [phone].

## 2024-11-20 NOTE — PROGRESS NOTE ADULT - ASSESSMENT
34y now P2 s/p rpt#2 LTCS now admitted for postpartum preeclampsia w/ SF, now on magnesium for seizure prophylaxis     Plan:  - s/p @1235 40mg IVP lasix @1236 10mg IVP hydralzine, amlodipine 5mg @1926, 10mg IVP hydral @2025, IVP 20mg labetalol @2053  - echo showed mild tricuspid regurgitation with normal EF and borderline pulmonary hypertension   - chest x-ray showed pulmonary edema   - strict BP monitoring (current /62)  - started on amlodipine 5mg   - Continue magnesium until 11/20 12:38   - continue to monitor for signs of magnesium toxicity q2h  - Pain management prn  - Cont seizure precautions  - strict I'S/O'S   - f/u in 2 hours

## 2024-11-20 NOTE — PROGRESS NOTE ADULT - SUBJECTIVE AND OBJECTIVE BOX
PGY1 Magnesium Note     Subjective:   Pt evaluated at bedside for magnesium check. Pt found sitting comfortably, using her phone. Pt assessed due to low BPs on monitor, found to have cuff improperly placed. Cuff readjusted.   Pt explains her HA has returned, it is a consistent and constant 6/10 pain at the front of her forehead. Previously better with toradol and reglan. She denies vision changes, dizziness, SOB, chest pain, RUQ/epigastric pain, new or worsening LE edema.    Objective:   Vital signs: T(F): 97 (11-19-24 @ 19:33), Max: 97 (11-19-24 @ 19:33)  HR: 96 (11-20-24 @ 04:10) (52 - 96)  BP: 75/45 (11-20-24 @ 04:06) (75/45 - 180/81)  RR: 16 (11-20-24 @ 00:55) (16 - 18)  SpO2: 99% (11-20-24 @ 04:10) (86% - 100%)    11-19-24 @ 07:01  -  11-20-24 @ 04:14  --------------------------------------------------------  IN: 2300 mL / OUT: 8190 mL / NET: -5890 mL    UO (60cc/hr) (9018-3505) 5700cc (3082-3309) 1375cc (8269-1576) 450cc (5910-6719) 150cc (8399-4194) 145cc (5540-9338) 220cc    Gen: NAD, AAOx3  CV: S1S2, RRR, no M/R/G  Pulm: CTAB  Ext: no calf tenderness or edema, SCDs in place  Neuro: 2+ DTR bilaterally  Abd: soft, nontender, no rebound or guarding, no epigastric tenderness  Lochia: minimal   Incision: Clean, dry, intact. No drainage or surrounding erythema.     Medications:  amLODIPine   Tablet: 5 (11-19-24 @ 19:26)  furosemide   Injectable: 40 (11-19-24 @ 12:33)  hydrALAZINE Injectable: 10 (11-19-24 @ 12:35)  hydrALAZINE Injectable: 10 (11-19-24 @ 20:24)  ibuprofen  Tablet.: 600 (11-19-24 @ 15:27)  ketorolac   Injectable: 30 (11-19-24 @ 23:21)  labetalol Injectable: 20 (11-19-24 @ 20:53)  lactated ringers.: 125 (11-19-24 @ 13:21)  magnesium sulfate  IVPB: 300 (11-19-24 @ 12:38)  magnesium sulfate Infusion: 50 (11-19-24 @ 12:24)  metoclopramide Injectable: 10 (11-19-24 @ 17:42)      Labs:   11/19 10.24>11.3/33.9<293, 140/4.1/106/20/11/0.8<73, AST/ALT 46/56, Uric Acid 8.5, , Troponin 22, , Coags 11/0.93/27.8, fibrinogen 513  @ 1740 11.43>12.5/37.2<358; 137/4.0/101/26/12/0.8<86; AST/ALT 45/60 Uric Acid 9.6  Troponin 21 Lactate 1.3 Mg 3.8  @2321 10.46>12.1/35.9<364, 138/3.6/102/22/14/13/0.8<98, AST/ALT 45/58, Uric acid 9.4, , Mag 4.3    EKG: sinus masha with sinus arrythmia (46bpm), otherwise normal  TTE  1. Hyperdynamic global left ventricular systolic function. Left   ventricular ejection fraction, by visual estimation, is 70 to 75%. Normal   diastolic function. No regional wall motion abnormalities. 2. Normal right ventricular size and function.3. Mildly enlarged left atrium.4. Mild-moderate tricuspid regurgitation.  5. Borderline pulmonary hypertension (PASP = 39mmHg). 6. There is no evidence of pericardial effusion.  7. There are no prior echocardiograms available for comparison.  CXR: IMPRESSION: Bilateral opacification of the lung parenchyma, (right greater than left). Small bilateral pleural effusions.                        12.1   10.46 )-----------( 364      ( 19 Nov 2024 23:21 )             35.9   11-19    138  |  102  |  13  ----------------------------<  98  3.6   |  22  |  0.8    Ca    8.3[L]      19 Nov 2024 23:21  Mg     4.3     11-19    TPro  6.6  /  Alb  3.5  /  TBili  0.3  /  DBili  x   /  AST  45[H]  /  ALT  58[H]  /  AlkPhos  117[H]  11-19      Urinalysis Basic - ( 19 Nov 2024 23:21 )    Color: x / Appearance: x / SG: x / pH: x  Gluc: 98 mg/dL / Ketone: x  / Bili: x / Urobili: x   Blood: x / Protein: x / Nitrite: x   Leuk Esterase: x / RBC: x / WBC x   Sq Epi: x / Non Sq Epi: x / Bacteria: x

## 2024-11-20 NOTE — PROGRESS NOTE ADULT - ATTENDING COMMENTS
33 y/o s/p RCS, readmitted for postpartum preeclampsia with severe features. Continue magnesium sulfate for seizure prophylaxis. Monitor blood pressures and labs.

## 2024-11-21 DIAGNOSIS — Z28.9 IMMUNIZATION NOT CARRIED OUT FOR UNSPECIFIED REASON: ICD-10-CM

## 2024-11-21 DIAGNOSIS — Z28.21 IMMUNIZATION NOT CARRIED OUT BECAUSE OF PATIENT REFUSAL: ICD-10-CM

## 2024-11-21 DIAGNOSIS — D57.3 SICKLE-CELL TRAIT: ICD-10-CM

## 2024-11-21 DIAGNOSIS — Z3A.38 38 WEEKS GESTATION OF PREGNANCY: ICD-10-CM

## 2024-11-21 DIAGNOSIS — Z30.017 ENCOUNTER FOR INITIAL PRESCRIPTION OF IMPLANTABLE SUBDERMAL CONTRACEPTIVE: ICD-10-CM

## 2024-11-21 DIAGNOSIS — O34.211 MATERNAL CARE FOR LOW TRANSVERSE SCAR FROM PREVIOUS CESAREAN DELIVERY: ICD-10-CM

## 2024-11-22 ENCOUNTER — NON-APPOINTMENT (OUTPATIENT)
Age: 34
End: 2024-11-22

## 2024-11-22 ENCOUNTER — OUTPATIENT (OUTPATIENT)
Dept: OUTPATIENT SERVICES | Facility: HOSPITAL | Age: 34
LOS: 1 days | End: 2024-11-22
Payer: MEDICAID

## 2024-11-22 ENCOUNTER — APPOINTMENT (OUTPATIENT)
Dept: ANTEPARTUM | Facility: CLINIC | Age: 34
End: 2024-11-22
Payer: MEDICAID

## 2024-11-22 VITALS
OXYGEN SATURATION: 99 % | SYSTOLIC BLOOD PRESSURE: 137 MMHG | HEART RATE: 73 BPM | DIASTOLIC BLOOD PRESSURE: 92 MMHG | WEIGHT: 252 LBS | BODY MASS INDEX: 39.47 KG/M2

## 2024-11-22 DIAGNOSIS — O09.90 SUPERVISION OF HIGH RISK PREGNANCY, UNSPECIFIED, UNSPECIFIED TRIMESTER: ICD-10-CM

## 2024-11-22 DIAGNOSIS — Z98.891 HISTORY OF UTERINE SCAR FROM PREVIOUS SURGERY: Chronic | ICD-10-CM

## 2024-11-22 PROCEDURE — 99204 OFFICE O/P NEW MOD 45 MIN: CPT

## 2024-11-22 PROCEDURE — 99214 OFFICE O/P EST MOD 30 MIN: CPT

## 2024-11-22 PROCEDURE — 81002 URINALYSIS NONAUTO W/O SCOPE: CPT

## 2024-11-22 RX ORDER — AMLODIPINE BESYLATE 10 MG/1
10 TABLET ORAL DAILY
Qty: 90 | Refills: 2 | Status: ACTIVE | COMMUNITY
Start: 2024-11-22 | End: 1900-01-01

## 2024-11-26 ENCOUNTER — OUTPATIENT (OUTPATIENT)
Dept: OUTPATIENT SERVICES | Facility: HOSPITAL | Age: 34
LOS: 1 days | End: 2024-11-26
Payer: MEDICAID

## 2024-11-26 ENCOUNTER — NON-APPOINTMENT (OUTPATIENT)
Age: 34
End: 2024-11-26

## 2024-11-26 ENCOUNTER — APPOINTMENT (OUTPATIENT)
Dept: ANTEPARTUM | Facility: CLINIC | Age: 34
End: 2024-11-26
Payer: MEDICAID

## 2024-11-26 ENCOUNTER — APPOINTMENT (OUTPATIENT)
Dept: OBGYN | Facility: CLINIC | Age: 34
End: 2024-11-26
Payer: MEDICAID

## 2024-11-26 VITALS
WEIGHT: 252 LBS | DIASTOLIC BLOOD PRESSURE: 86 MMHG | HEIGHT: 67 IN | SYSTOLIC BLOOD PRESSURE: 138 MMHG | BODY MASS INDEX: 39.55 KG/M2

## 2024-11-26 DIAGNOSIS — Z34.90 ENCOUNTER FOR SUPERVISION OF NORMAL PREGNANCY, UNSPECIFIED, UNSPECIFIED TRIMESTER: ICD-10-CM

## 2024-11-26 DIAGNOSIS — Z98.891 HISTORY OF UTERINE SCAR FROM PREVIOUS SURGERY: ICD-10-CM

## 2024-11-26 DIAGNOSIS — Z98.891 HISTORY OF UTERINE SCAR FROM PREVIOUS SURGERY: Chronic | ICD-10-CM

## 2024-11-26 DIAGNOSIS — J81.0 ACUTE PULMONARY EDEMA: ICD-10-CM

## 2024-11-26 DIAGNOSIS — I27.20 PULMONARY HYPERTENSION, UNSPECIFIED: ICD-10-CM

## 2024-11-26 DIAGNOSIS — E66.01 MORBID (SEVERE) OBESITY DUE TO EXCESS CALORIES: ICD-10-CM

## 2024-11-26 DIAGNOSIS — D57.3 SICKLE-CELL TRAIT: ICD-10-CM

## 2024-11-26 PROCEDURE — 99212 OFFICE O/P EST SF 10 MIN: CPT

## 2024-11-27 ENCOUNTER — NON-APPOINTMENT (OUTPATIENT)
Age: 34
End: 2024-11-27

## 2024-12-03 ENCOUNTER — APPOINTMENT (OUTPATIENT)
Dept: OBGYN | Facility: CLINIC | Age: 34
End: 2024-12-03

## 2024-12-09 ENCOUNTER — NON-APPOINTMENT (OUTPATIENT)
Age: 34
End: 2024-12-09

## 2024-12-23 ENCOUNTER — OUTPATIENT (OUTPATIENT)
Dept: OUTPATIENT SERVICES | Facility: HOSPITAL | Age: 34
LOS: 1 days | End: 2024-12-23
Payer: MEDICAID

## 2024-12-23 ENCOUNTER — APPOINTMENT (OUTPATIENT)
Dept: OBGYN | Facility: CLINIC | Age: 34
End: 2024-12-23
Payer: MEDICAID

## 2024-12-23 VITALS
DIASTOLIC BLOOD PRESSURE: 58 MMHG | SYSTOLIC BLOOD PRESSURE: 136 MMHG | BODY MASS INDEX: 37.83 KG/M2 | WEIGHT: 241 LBS | HEIGHT: 67 IN

## 2024-12-23 DIAGNOSIS — Z98.891 HISTORY OF UTERINE SCAR FROM PREVIOUS SURGERY: Chronic | ICD-10-CM

## 2024-12-23 PROCEDURE — 99212 OFFICE O/P EST SF 10 MIN: CPT

## 2025-03-04 NOTE — DISCHARGE NOTE OB - NS AS DC AMI YN
DISCHARGE PLANNING NOTE:    Reached out to [facility] to see if they received referral and can take patient. Message left for Maira to call writer back.    Electronically signed by Rochelle Chavarria RN on 3/4/2025 at 3:32 PM    
no

## 2025-05-06 ENCOUNTER — OUTPATIENT (OUTPATIENT)
Dept: OUTPATIENT SERVICES | Facility: HOSPITAL | Age: 35
LOS: 1 days | End: 2025-05-06
Payer: MEDICAID

## 2025-05-06 ENCOUNTER — APPOINTMENT (OUTPATIENT)
Dept: OBGYN | Facility: CLINIC | Age: 35
End: 2025-05-06
Payer: MEDICAID

## 2025-05-06 VITALS
SYSTOLIC BLOOD PRESSURE: 134 MMHG | BODY MASS INDEX: 38.14 KG/M2 | HEIGHT: 67 IN | DIASTOLIC BLOOD PRESSURE: 82 MMHG | WEIGHT: 243 LBS

## 2025-05-06 DIAGNOSIS — Z98.891 HISTORY OF UTERINE SCAR FROM PREVIOUS SURGERY: Chronic | ICD-10-CM

## 2025-05-06 DIAGNOSIS — Z30.46 ENCOUNTER FOR SURVEILLANCE OF IMPLANTABLE SUBDERMAL CONTRACEPTIVE: ICD-10-CM

## 2025-05-06 DIAGNOSIS — Z30.41 ENCOUNTER FOR SURVEILLANCE OF CONTRACEPTIVE PILLS: ICD-10-CM

## 2025-05-06 DIAGNOSIS — Z00.00 ENCOUNTER FOR GENERAL ADULT MEDICAL EXAMINATION WITHOUT ABNORMAL FINDINGS: ICD-10-CM

## 2025-05-06 PROCEDURE — 11983 REMOVE/INSERT DRUG IMPLANT: CPT

## 2025-05-06 PROCEDURE — 99213 OFFICE O/P EST LOW 20 MIN: CPT | Mod: 25

## 2025-05-06 RX ORDER — NORETHINDRONE ACETATE AND ETHINYL ESTRADIOL AND FERROUS FUMARATE 1MG-20(21)
1-20 KIT ORAL DAILY
Qty: 28 | Refills: 12 | Status: ACTIVE | COMMUNITY
Start: 2025-05-06 | End: 1900-01-01

## 2025-05-07 DIAGNOSIS — Z30.46 ENCOUNTER FOR SURVEILLANCE OF IMPLANTABLE SUBDERMAL CONTRACEPTIVE: ICD-10-CM

## 2025-05-30 ENCOUNTER — NON-APPOINTMENT (OUTPATIENT)
Age: 35
End: 2025-05-30

## 2025-06-18 ENCOUNTER — NON-APPOINTMENT (OUTPATIENT)
Age: 35
End: 2025-06-18

## 2025-06-18 ENCOUNTER — OUTPATIENT (OUTPATIENT)
Dept: OUTPATIENT SERVICES | Facility: HOSPITAL | Age: 35
LOS: 1 days | End: 2025-06-18
Payer: MEDICAID

## 2025-06-18 ENCOUNTER — APPOINTMENT (OUTPATIENT)
Dept: OBGYN | Facility: CLINIC | Age: 35
End: 2025-06-18
Payer: MEDICAID

## 2025-06-18 VITALS
WEIGHT: 239.5 LBS | DIASTOLIC BLOOD PRESSURE: 86 MMHG | SYSTOLIC BLOOD PRESSURE: 139 MMHG | HEIGHT: 67 IN | BODY MASS INDEX: 37.59 KG/M2

## 2025-06-18 DIAGNOSIS — Z98.891 HISTORY OF UTERINE SCAR FROM PREVIOUS SURGERY: Chronic | ICD-10-CM

## 2025-06-18 DIAGNOSIS — Z01.419 ENCOUNTER FOR GYNECOLOGICAL EXAMINATION (GENERAL) (ROUTINE) WITHOUT ABNORMAL FINDINGS: ICD-10-CM

## 2025-06-18 PROCEDURE — 87624 HPV HI-RISK TYP POOLED RSLT: CPT

## 2025-06-18 PROCEDURE — 88142 CYTOPATH C/V THIN LAYER: CPT

## 2025-06-18 PROCEDURE — 87491 CHLMYD TRACH DNA AMP PROBE: CPT

## 2025-06-18 PROCEDURE — 81513 NFCT DS BV RNA VAG FLU ALG: CPT

## 2025-06-18 PROCEDURE — 99395 PREV VISIT EST AGE 18-39: CPT

## 2025-06-18 PROCEDURE — 99459 PELVIC EXAMINATION: CPT

## 2025-06-18 PROCEDURE — 87661 TRICHOMONAS VAGINALIS AMPLIF: CPT

## 2025-06-18 PROCEDURE — G0444: CPT

## 2025-06-18 PROCEDURE — 87481 CANDIDA DNA AMP PROBE: CPT

## 2025-06-18 PROCEDURE — 87591 N.GONORRHOEAE DNA AMP PROB: CPT

## 2025-06-18 RX ORDER — NORETHINDRONE ACETATE AND ETHINYL ESTRADIOL, ETHINYL ESTRADIOL AND FERROUS FUMARATE 1MG-10(24)
1 MG-10 MCG / KIT ORAL DAILY
Qty: 1 | Refills: 12 | Status: ACTIVE | COMMUNITY
Start: 2025-06-18 | End: 1900-01-01

## 2025-06-20 ENCOUNTER — OUTPATIENT (OUTPATIENT)
Dept: OUTPATIENT SERVICES | Facility: HOSPITAL | Age: 35
LOS: 1 days | End: 2025-06-20

## 2025-06-20 DIAGNOSIS — Z98.891 HISTORY OF UTERINE SCAR FROM PREVIOUS SURGERY: Chronic | ICD-10-CM

## 2025-06-20 DIAGNOSIS — Z00.00 ENCOUNTER FOR GENERAL ADULT MEDICAL EXAMINATION WITHOUT ABNORMAL FINDINGS: ICD-10-CM

## 2025-06-20 LAB
BV BACTERIA RRNA VAG QL NAA+PROBE: NOT DETECTED
C GLABRATA RNA VAG QL NAA+PROBE: NOT DETECTED
C TRACH RRNA SPEC QL NAA+PROBE: NOT DETECTED
CANDIDA RRNA VAG QL PROBE: DETECTED
N GONORRHOEA RRNA SPEC QL NAA+PROBE: NOT DETECTED
T VAGINALIS RRNA SPEC QL NAA+PROBE: NOT DETECTED

## 2025-06-21 DIAGNOSIS — Z00.00 ENCOUNTER FOR GENERAL ADULT MEDICAL EXAMINATION WITHOUT ABNORMAL FINDINGS: ICD-10-CM

## 2025-06-22 LAB — HPV HIGH+LOW RISK DNA PNL CVX: NOT DETECTED

## 2025-06-30 DIAGNOSIS — Z00.00 ENCOUNTER FOR GENERAL ADULT MEDICAL EXAMINATION WITHOUT ABNORMAL FINDINGS: ICD-10-CM
